# Patient Record
Sex: MALE | Race: WHITE | URBAN - NONMETROPOLITAN AREA
[De-identification: names, ages, dates, MRNs, and addresses within clinical notes are randomized per-mention and may not be internally consistent; named-entity substitution may affect disease eponyms.]

---

## 2017-09-08 ENCOUNTER — HOSPITAL ENCOUNTER (INPATIENT)
Facility: HOSPITAL | Age: 82
LOS: 5 days | Discharge: HOME | DRG: 603 | End: 2017-09-13
Attending: FAMILY MEDICINE | Admitting: FAMILY MEDICINE
Payer: MEDICARE

## 2017-09-08 DIAGNOSIS — L03.119 CELLULITIS AND ABSCESS OF FOOT: Primary | ICD-10-CM

## 2017-09-08 DIAGNOSIS — L02.619 CELLULITIS AND ABSCESS OF FOOT: Primary | ICD-10-CM

## 2017-09-08 LAB
ALBUMIN SERPL-MCNC: 3.4 G/DL (ref 3.4–5)
ALP SERPL-CCNC: 61 U/L (ref 40–150)
ALT SERPL W P-5'-P-CCNC: 21 U/L (ref 0–70)
ANION GAP SERPL CALCULATED.3IONS-SCNC: 9 MMOL/L (ref 3–14)
AST SERPL W P-5'-P-CCNC: 13 U/L (ref 0–45)
BASOPHILS # BLD AUTO: 0 10E9/L (ref 0–0.2)
BASOPHILS NFR BLD AUTO: 0.3 %
BILIRUB SERPL-MCNC: 0.4 MG/DL (ref 0.2–1.3)
BUN SERPL-MCNC: 14 MG/DL (ref 7–30)
CALCIUM SERPL-MCNC: 8.8 MG/DL (ref 8.5–10.1)
CHLORIDE SERPL-SCNC: 105 MMOL/L (ref 94–109)
CO2 SERPL-SCNC: 28 MMOL/L (ref 20–32)
CREAT SERPL-MCNC: 0.88 MG/DL (ref 0.66–1.25)
DIFFERENTIAL METHOD BLD: ABNORMAL
EOSINOPHIL # BLD AUTO: 0.1 10E9/L (ref 0–0.7)
EOSINOPHIL NFR BLD AUTO: 1.3 %
ERYTHROCYTE [DISTWIDTH] IN BLOOD BY AUTOMATED COUNT: 14 % (ref 10–15)
GFR SERPL CREATININE-BSD FRML MDRD: 81 ML/MIN/1.7M2
GLUCOSE SERPL-MCNC: 113 MG/DL (ref 70–99)
HCT VFR BLD AUTO: 34.9 % (ref 40–53)
HGB BLD-MCNC: 11.8 G/DL (ref 13.3–17.7)
IMM GRANULOCYTES # BLD: 0 10E9/L (ref 0–0.4)
IMM GRANULOCYTES NFR BLD: 0.3 %
LYMPHOCYTES # BLD AUTO: 1.7 10E9/L (ref 0.8–5.3)
LYMPHOCYTES NFR BLD AUTO: 18.8 %
MCH RBC QN AUTO: 31.1 PG (ref 26.5–33)
MCHC RBC AUTO-ENTMCNC: 33.8 G/DL (ref 31.5–36.5)
MCV RBC AUTO: 92 FL (ref 78–100)
MONOCYTES # BLD AUTO: 1.1 10E9/L (ref 0–1.3)
MONOCYTES NFR BLD AUTO: 11.9 %
NEUTROPHILS # BLD AUTO: 6 10E9/L (ref 1.6–8.3)
NEUTROPHILS NFR BLD AUTO: 67.4 %
NRBC # BLD AUTO: 0 10*3/UL
NRBC BLD AUTO-RTO: 0 /100
PLATELET # BLD AUTO: 199 10E9/L (ref 150–450)
POTASSIUM SERPL-SCNC: 4.4 MMOL/L (ref 3.4–5.3)
PROT SERPL-MCNC: 7.9 G/DL (ref 6.8–8.8)
RBC # BLD AUTO: 3.8 10E12/L (ref 4.4–5.9)
SODIUM SERPL-SCNC: 142 MMOL/L (ref 133–144)
WBC # BLD AUTO: 8.9 10E9/L (ref 4–11)

## 2017-09-08 PROCEDURE — 25000125 ZZHC RX 250: Performed by: FAMILY MEDICINE

## 2017-09-08 PROCEDURE — 73720 MRI LWR EXTREMITY W/O&W/DYE: CPT | Mod: TC,RT

## 2017-09-08 PROCEDURE — 40000786 ZZHCL STATISTIC ACTIVE MRSA SURVEILLANCE CULTURE: Performed by: FAMILY MEDICINE

## 2017-09-08 PROCEDURE — 25000128 H RX IP 250 OP 636: Performed by: FAMILY MEDICINE

## 2017-09-08 PROCEDURE — A9270 NON-COVERED ITEM OR SERVICE: HCPCS | Mod: GY | Performed by: FAMILY MEDICINE

## 2017-09-08 PROCEDURE — 12000000 ZZH R&B MED SURG/OB

## 2017-09-08 PROCEDURE — S0077 INJECTION, CLINDAMYCIN PHOSP: HCPCS | Performed by: FAMILY MEDICINE

## 2017-09-08 PROCEDURE — A9585 GADOBUTROL INJECTION: HCPCS | Mod: TC

## 2017-09-08 PROCEDURE — 25000132 ZZH RX MED GY IP 250 OP 250 PS 637: Mod: GY | Performed by: FAMILY MEDICINE

## 2017-09-08 PROCEDURE — 36415 COLL VENOUS BLD VENIPUNCTURE: CPT | Performed by: FAMILY MEDICINE

## 2017-09-08 PROCEDURE — 85025 COMPLETE CBC W/AUTO DIFF WBC: CPT | Performed by: FAMILY MEDICINE

## 2017-09-08 PROCEDURE — 80053 COMPREHEN METABOLIC PANEL: CPT | Performed by: FAMILY MEDICINE

## 2017-09-08 PROCEDURE — 87040 BLOOD CULTURE FOR BACTERIA: CPT | Performed by: FAMILY MEDICINE

## 2017-09-08 RX ORDER — GABAPENTIN 300 MG/1
300 CAPSULE ORAL 3 TIMES DAILY
Status: DISCONTINUED | OUTPATIENT
Start: 2017-09-08 | End: 2017-09-13 | Stop reason: HOSPADM

## 2017-09-08 RX ORDER — LIDOCAINE 40 MG/G
CREAM TOPICAL
Status: DISCONTINUED | OUTPATIENT
Start: 2017-09-08 | End: 2017-09-13 | Stop reason: HOSPADM

## 2017-09-08 RX ORDER — CALCIUM CARBONATE 500 MG/1
500 TABLET, CHEWABLE ORAL 3 TIMES DAILY PRN
Status: DISCONTINUED | OUTPATIENT
Start: 2017-09-08 | End: 2017-09-13 | Stop reason: HOSPADM

## 2017-09-08 RX ORDER — CLINDAMYCIN PHOSPHATE 900 MG/50ML
900 INJECTION, SOLUTION INTRAVENOUS EVERY 8 HOURS
Status: DISCONTINUED | OUTPATIENT
Start: 2017-09-08 | End: 2017-09-13 | Stop reason: HOSPADM

## 2017-09-08 RX ORDER — CARVEDILOL 3.12 MG/1
6.25 TABLET ORAL 2 TIMES DAILY WITH MEALS
Status: DISCONTINUED | OUTPATIENT
Start: 2017-09-08 | End: 2017-09-09

## 2017-09-08 RX ORDER — ONDANSETRON 4 MG/1
4 TABLET, ORALLY DISINTEGRATING ORAL EVERY 6 HOURS PRN
Status: DISCONTINUED | OUTPATIENT
Start: 2017-09-08 | End: 2017-09-13 | Stop reason: HOSPADM

## 2017-09-08 RX ORDER — ISOSORBIDE MONONITRATE 30 MG/1
30 TABLET, EXTENDED RELEASE ORAL EVERY EVENING
Status: DISCONTINUED | OUTPATIENT
Start: 2017-09-08 | End: 2017-09-13 | Stop reason: HOSPADM

## 2017-09-08 RX ORDER — HYDROCODONE BITARTRATE AND ACETAMINOPHEN 5; 325 MG/1; MG/1
1-2 TABLET ORAL EVERY 4 HOURS PRN
Status: DISCONTINUED | OUTPATIENT
Start: 2017-09-08 | End: 2017-09-13 | Stop reason: HOSPADM

## 2017-09-08 RX ORDER — NALOXONE HYDROCHLORIDE 0.4 MG/ML
.1-.4 INJECTION, SOLUTION INTRAMUSCULAR; INTRAVENOUS; SUBCUTANEOUS
Status: DISCONTINUED | OUTPATIENT
Start: 2017-09-08 | End: 2017-09-13 | Stop reason: HOSPADM

## 2017-09-08 RX ORDER — ASPIRIN 81 MG/1
81 TABLET, CHEWABLE ORAL DAILY
Status: DISCONTINUED | OUTPATIENT
Start: 2017-09-09 | End: 2017-09-13 | Stop reason: HOSPADM

## 2017-09-08 RX ORDER — GADOBUTROL 604.72 MG/ML
7.5 INJECTION INTRAVENOUS ONCE
Status: COMPLETED | OUTPATIENT
Start: 2017-09-08 | End: 2017-09-08

## 2017-09-08 RX ORDER — ACETAMINOPHEN 325 MG/1
650 TABLET ORAL EVERY 4 HOURS PRN
Status: DISCONTINUED | OUTPATIENT
Start: 2017-09-08 | End: 2017-09-13 | Stop reason: HOSPADM

## 2017-09-08 RX ORDER — ONDANSETRON 2 MG/ML
4 INJECTION INTRAMUSCULAR; INTRAVENOUS EVERY 6 HOURS PRN
Status: DISCONTINUED | OUTPATIENT
Start: 2017-09-08 | End: 2017-09-13 | Stop reason: HOSPADM

## 2017-09-08 RX ADMIN — CARVEDILOL 3.12 MG: 3.12 TABLET, FILM COATED ORAL at 21:38

## 2017-09-08 RX ADMIN — GADOBUTROL 7.5 ML: 604.72 INJECTION INTRAVENOUS at 15:42

## 2017-09-08 RX ADMIN — ISOSORBIDE MONONITRATE 30 MG: 30 TABLET, EXTENDED RELEASE ORAL at 21:40

## 2017-09-08 RX ADMIN — CLINDAMYCIN PHOSPHATE 900 MG: 18 INJECTION, SOLUTION INTRAVENOUS at 14:02

## 2017-09-08 RX ADMIN — VANCOMYCIN HYDROCHLORIDE 1500 MG: 1 INJECTION, POWDER, LYOPHILIZED, FOR SOLUTION INTRAVENOUS at 14:47

## 2017-09-08 RX ADMIN — CLINDAMYCIN PHOSPHATE 900 MG: 18 INJECTION, SOLUTION INTRAVENOUS at 21:32

## 2017-09-08 RX ADMIN — GABAPENTIN 300 MG: 300 CAPSULE ORAL at 16:06

## 2017-09-08 RX ADMIN — GABAPENTIN 300 MG: 300 CAPSULE ORAL at 21:39

## 2017-09-08 NOTE — PLAN OF CARE
"[facility] Observation Note:  Patient is registered to observation and is in 3218/3218-1 at approximately 1155 via wheel chair accompanied by staff  from clinic .  Patient ambulated to bed via self.. Patient is alert and oriented X 3, reports pain; rates at 3 on 0-10 scale.  Patient oriented to room, unit, hourly rounding, and plan of care. Explained the admission packet including \"What is Observation Status\" and patient handbook with patient bill of rights brochure. Will continue to monitor and document as needed.  Nursing Observation criteria listed below was met:    Health care directives status obtained and documented: Yes    Care Everywhere authorization completed No      MRSA swab completed for patient 65 years and older: Yes      If initial lactic acid >2.0, repeat lactic acid drawn within one hour of arrival to unit: NA. If no, state reason: NA      Patient identifies a surrogate decision maker: Yes If yes, who:Wife  Contact Information:see facesheet     Vaccination assessment and education completed: Yes   Vaccinations received prior to hospitalization: Pneumovax yes  Influenza(seasonal)  YES   Vaccination(s) ordered: patient does not meet criteria      Skin issues/needs documented:Yes    Isolation Patient: no Education given and documented, correct sign in place and documentation row added to PCS:  No      Fall Prevention: Observation fall risk completed:  Yes Education given and documented, sticker and magnet in place: Yes      General Care Plan initiated with observation goal(s): Yes    Education (including assessment) Documented: Yes    New medication information given to patient and documented: NA     Patient elects to use own medications from home during hospitalization:  No If yes, a MD order was obtained to use Medications from Home:  No and home medications were sent to Pharmacy for verification for use during hospitalization: NA     Patient has discharge needs (If yes, please explain): " No

## 2017-09-08 NOTE — PHARMACY-VANCOMYCIN DOSING SERVICE
Pharmacy Vancomycin Initial Note  Date of Service 2017  Patient's  [id number]  88 year old, male    Indication: Skin and Soft Tissue Infection    Current estimated CrCl = Estimated Creatinine Clearance: 63.2 mL/min (based on Cr of 0.88).    Creatinine for last 3 days  2017:  1:29 PM Creatinine 0.88 mg/dL    Recent Vancomycin Level(s) for last 3 days  No results found for requested labs within last 72 hours.      Vancomycin IV Administrations (past 72 hours)      No vancomycin orders with administrations in past 72 hours.                Nephrotoxins and other renal medications (Future)    Start     Dose/Rate Route Frequency Ordered Stop    17 1530  vancomycin (VANCOCIN) 1,500 mg in NaCl 0.9 % 500 mL intermittent infusion      1,500 mg  376.7 mL/hr over 1.5 Hours Intravenous EVERY 18 HOURS 17 1421            Contrast Orders - past 72 hours     None                Plan:  1.  Start vancomycin  1500 mg IV q18h.   2.  Goal Trough Level: 15-20 mg/L assuming MRSA is suspected.  3.  Pharmacy will check trough levels as appropriate in 1-3 Days.    4. Serum creatinine levels will be ordered daily for the first week of therapy and at least twice weekly for subsequent weeks.    5. [place] method utilized to dose vancomycin therapy: [facility]    Dona Garcia

## 2017-09-08 NOTE — IP AVS SNAPSHOT
MRN:[id number]                      After Visit Summary   9/8/2017    Chino Harper    MRN: [id number]           Thank you!     Thank you for choosing [facility] for your care. Our goal is always to provide you with excellent care. Hearing back from our patients is one way we can continue to improve our services. Please take a few minutes to complete the written survey that you may receive in the mail after you visit with us. Thank you!        Patient Information     Date Of Birth          12/9/1928        Designated Caregiver       Most Recent Value    Caregiver    Will someone help with your care after discharge? yes    Name of designated caregiver Sandrita     Phone number of caregiver [phone]    Caregiver address [place]       About your hospital stay     You were admitted on:  September 8, 2017 You last received care in the:  [facility]    You were discharged on:  September 13, 2017       Who to Call     For medical emergencies, please call 911.  For non-urgent questions about your medical care, please call your primary care provider or clinic, [phone]          Attending Provider     Provider Specialty    Ban Ivy MD [facility]       Primary Care Provider Office Phone # Fax #    Ban Ivy [phone]      Further instructions from your care team       MRI Contrast Discharge Instructions    The IV contrast you received today will pass out of your body in your  urine. This will happen in the next 24 hours. You will not feel this process.  Your urine will not change color.    Drink at least 4 extra glasses of water or juice today (unless your doctor  has restricted your fluids). This reduces the stress on your kidneys.  You may take your regular medicines.    If you are on dialysis: It is best to have dialysis today.    If you have a reaction: Most reactions happen right away. If you have  any new symptoms after leaving the  "hospital (such as hives or swelling),  call your hospital at the correct number below. Or call your family doctor.  If you have breathing distress or wheezing, call 911.    Special instructions: ***    I have read and understand the above information.    Signature:______________________________________ Date:______[birth year]_____    Staff:__________________________________________ Date:___________     Time:__________    [facility] Departments:    ___[facility]: [phone]  ___[place]: [phone]  ___[place]: [phone] ___[facility]: [phone]  ___[facility]: [phone]  ___[facility]: [phone]  ___[place]: [phone]  ___[place]: [phone]  ___[phone]      YOU HAVE AN APPOINTMENT SET UP WITH DR BREWSTER AT THE [facility] [place] , Wednesday AT 10:30.    Pending Results     Date and Time Order Name Status Description    2017 1147 Blood culture Preliminary     2017 1147 Blood culture Preliminary             Statement of Approval     Ordered          17 0734  I have reviewed and agree with all the recommendations and orders detailed in this document.  EFFECTIVE NOW     Comments:  Discharge to home.  Has appt dr brewster/surgeon at 1030 at [facility]. Needs to be there at 1015   Approved and electronically signed by:  Ban Ivy MD             Admission Information     Date & Time Provider Department Dept. Phone    2017 Ban Ivy MD [facility] [phone]      Your Vitals Were     Blood Pressure Pulse Temperature Respirations Height Weight    146/79 (BP Location: Right arm) 74 98.7  F (37.1  C) (Tympanic) 20 1.753 m (5' 9\") 77 kg (169 lb 12.1 oz)    Pulse Oximetry BMI (Body Mass Index)                95% 25.07 kg/m2          [email or web address] Information     [email or web address] lets you send messages to your doctor, view your test results, renew your prescriptions, schedule appointments and more. To sign up, go to [email or web address] . Click " "on \"Log in\" on the left side of the screen, which will take you to the Welcome page. Then click on \"Sign up Now\" on the right side of the page.     You will be asked to enter the access code listed below, as well as some personal information. Please follow the directions to create your username and password.     Your access code is: [email or web address]  Expires: 2017  7:39 AM     Your access code will  in 90 days. If you need help or a new code, please call your [facility] clinic or [phone].        Care EveryWhere ID     This is your Care EveryWhere ID. This could be used by other organizations to access your [facility] medical records  [id number]        Equal Access to Services     SHAZIA SKELTON : Rebecca Zambrano, danii jin, britton mendez, jyotsna cerda . So [facility] [phone].    ATENCIÓN: Si habla español, tiene a dickson disposición servicios gratuitos de asistencia lingüística. Llame al [phone].    We comply with applicable federal civil rights laws and [place] laws. We do not discriminate on the basis of race, color, national origin, age, disability sex, sexual orientation or gender identity.               Review of your medicines      START taking        Dose / Directions    clindamycin 300 MG capsule   Commonly known as:  CLEOCIN        Dose:  300 mg   Take 1 capsule (300 mg) by mouth 3 times daily for 7 days   Quantity:  21 capsule   Refills:  0         CONTINUE these medicines which have NOT CHANGED        Dose / Directions    ASPIRIN PO        Dose:  81 mg   Take 81 mg by mouth daily   Refills:  0       calcium carbonate 500 MG chewable tablet   Commonly known as:  TUMS        Dose:  1 chew tab   Take 1 chew tab by mouth as needed for heartburn   Refills:  0       carvedilol 6.25 MG tablet   Commonly known as:  COREG   Used for:  ACS (acute coronary syndrome) (H)        Dose:  6.25 mg   Take 1 tablet (6.25 mg) by mouth 2 times daily (with " meals)   Quantity:  60 tablet   Refills:  0       GABAPENTIN PO        Dose:  300 mg   Take 300 mg by mouth 3 times daily   Refills:  0       GuaiFENesin 200 MG/5ML Liqd        Dose:  10 mL   Take 10 mLs by mouth 3 times daily as needed   Quantity:  118 mL   Refills:  0       isosorbide mononitrate 30 MG 24 hr tablet   Commonly known as:  IMDUR   Used for:  ACS (acute coronary syndrome) (H)        Dose:  30 mg   Take 1 tablet (30 mg) by mouth daily   Quantity:  30 tablet   Refills:  0       OMEGA-3 FISH OIL PO        Dose:  2 g   Take 2 g by mouth daily   Refills:  0            Where to get your medicines      Some of these will need a paper prescription and others can be bought over the counter. Ask your nurse if you have questions.     You don't need a prescription for these medications     clindamycin 300 MG capsule                Protect others around you: Learn how to safely use, store and throw away your medicines at www.disposemymeds.org.             Medication List: This is a list of all your medications and when to take them. Check marks below indicate your daily home schedule. Keep this list as a reference.      Medications           Morning Afternoon Evening Bedtime As Needed    ASPIRIN PO   Take 81 mg by mouth daily   Last time this was given:  81 mg on 9/13/2017  6:40 AM                                calcium carbonate 500 MG chewable tablet   Commonly known as:  TUMS   Take 1 chew tab by mouth as needed for heartburn                                carvedilol 6.25 MG tablet   Commonly known as:  COREG   Take 1 tablet (6.25 mg) by mouth 2 times daily (with meals)   Last time this was given:  3.125 mg on 9/13/2017  6:41 AM                                clindamycin 300 MG capsule   Commonly known as:  CLEOCIN   Take 1 capsule (300 mg) by mouth 3 times daily for 7 days                                GABAPENTIN PO   Take 300 mg by mouth 3 times daily   Last time this was given:  300 mg on 9/13/2017  6:40 AM                                 GuaiFENesin 200 MG/5ML Liqd   Take 10 mLs by mouth 3 times daily as needed                                isosorbide mononitrate 30 MG 24 hr tablet   Commonly known as:  IMDUR   Take 1 tablet (30 mg) by mouth daily   Last time this was given:  30 mg on 9/12/2017  8:09 PM                                OMEGA-3 FISH OIL PO   Take 2 g by mouth daily                                          More Information        Enoxaparin Sodium Solution for injection  What is this medicine?  ENOXAPARIN (ee nox a PA rin) is used after knee, hip, or abdominal surgeries to prevent blood clotting. It is also used to treat existing blood clots in the lungs or in the veins.  This medicine may be used for other purposes; ask your health care provider or pharmacist if you have questions.  What should I tell my health care provider before I take this medicine?  They need to know if you have any of these conditions:    bleeding disorders, hemorrhage, or hemophilia    infection of the heart or heart valves    kidney or liver disease    previous stroke    prosthetic heart valve    recent surgery or delivery of a baby    ulcer in the stomach or intestine, diverticulitis, or other bowel disease    an unusual or allergic reaction to enoxaparin, heparin, pork or pork products, other medicines, foods, dyes, or preservatives    pregnant or trying to get pregnant    breast-feeding  How should I use this medicine?  This medicine is for injection under the skin. It is usually given by a health-care professional. You or a family member may be trained on how to give the injections. If you are to give yourself injections, make sure you understand how to use the syringe, measure the dose if necessary, and give the injection. To avoid bruising, do not rub the site where this medicine has been injected. Do not take your medicine more often than directed. Do not stop taking except on the advice of your doctor or health care  professional.  Make sure you receive a puncture-resistant container to dispose of the needles and syringes once you have finished with them. Do not reuse these items. Return the container to your doctor or health care professional for proper disposal.  Talk to your pediatrician regarding the use of this medicine in children. Special care may be needed.  Overdosage: If you think you have taken too much of this medicine contact a poison control center or emergency room at once.  NOTE: This medicine is only for you. Do not share this medicine with others.  What if I miss a dose?  If you miss a dose, take it as soon as you can. If it is almost time for your next dose, take only that dose. Do not take double or extra doses.  What may interact with this medicine?    aspirin and aspirin-like medicines    certain medicines that treat or prevent blood clots    dipyridamole    NSAIDs, medicines for pain and inflammation, like ibuprofen or naproxen  This list may not describe all possible interactions. Give your health care provider a list of all the medicines, herbs, non-prescription drugs, or dietary supplements you use. Also tell them if you smoke, drink alcohol, or use illegal drugs. Some items may interact with your medicine.  What should I watch for while using this medicine?  Visit your doctor or health care professional for regular checks on your progress. Your condition will be monitored carefully while you are receiving this medicine.  Notify your doctor or health care professional and seek emergency treatment if you develop breathing problems; changes in vision; chest pain; severe, sudden headache; pain, swelling, warmth in the leg; trouble speaking; sudden numbness or weakness of the face, arm, or leg. These can be signs that your condition has gotten worse.  If you are going to have surgery, tell your doctor or health care professional that you are taking this medicine.  Do not stop taking this medicine without  first talking to your doctor. Be sure to refill your prescription before you run out of medicine.  Avoid sports and activities that might cause injury while you are using this medicine. Severe falls or injuries can cause unseen bleeding. Be careful when using sharp tools or knives. Consider using an electric razor. Take special care brushing or flossing your teeth. Report any injuries, bruising, or red spots on the skin to your doctor or health care professional.  What side effects may I notice from receiving this medicine?  Side effects that you should report to your doctor or health care professional as soon as possible:    allergic reactions like skin rash, itching or hives, swelling of the face, lips, or tongue    feeling faint or lightheaded, falls    signs and symptoms of bleeding such as bloody or black, tarry stools; red or dark-brown urine; spitting up blood or brown material that looks like coffee grounds; red spots on the skin; unusual bruising or bleeding from the eye, gums, or nose  Side effects that usually do not require medical attention (report to your doctor or health care professional if they continue or are bothersome):    pain, redness, or irritation at site where injected  This list may not describe all possible side effects. Call your doctor for medical advice about side effects. You may report side effects to FDA at 2-306-FDA-1132.  Where should I keep my medicine?  Keep out of the reach of children.  Store at room temperature between 15 and 30 degrees C (59 and 86 degrees F). Do not freeze. If your injections have been specially prepared, you may need to store them in the refrigerator. Ask your pharmacist. Throw away any unused medicine after the expiration date.  NOTE:This sheet is a summary. It may not cover all possible information. If you have questions about this medicine, talk to your doctor, pharmacist, or health care provider. Copyright  2016 [employer] Standard                Enoxaparin  injection  What is this medicine?  ENOXAPARIN (ee nox a PA rin) is used after knee, hip, or abdominal surgeries to prevent blood clotting. It is also used to treat existing blood clots in the lungs or in the veins.  How should I use this medicine?  This medicine is for injection under the skin. It is usually given by a health-care professional. You or a family member may be trained on how to give the injections. If you are to give yourself injections, make sure you understand how to use the syringe, measure the dose if necessary, and give the injection. To avoid bruising, do not rub the site where this medicine has been injected. Do not take your medicine more often than directed. Do not stop taking except on the advice of your doctor or health care professional.  Make sure you receive a puncture-resistant container to dispose of the needles and syringes once you have finished with them. Do not reuse these items. Return the container to your doctor or health care professional for proper disposal.  Talk to your pediatrician regarding the use of this medicine in children. Special care may be needed.  What side effects may I notice from receiving this medicine?  Side effects that you should report to your doctor or health care professional as soon as possible:    allergic reactions like skin rash, itching or hives, swelling of the face, lips, or tongue    feeling faint or lightheaded, falls    signs and symptoms of bleeding such as bloody or black, tarry stools; red or dark-brown urine; spitting up blood or brown material that looks like coffee grounds; red spots on the skin; unusual bruising or bleeding from the eye, gums, or nose  Side effects that usually do not require medical attention (report to your doctor or health care professional if they continue or are bothersome):    pain, redness, or irritation at site where injected  What may interact with this medicine?    aspirin and aspirin-like medicines    certain  medicines that treat or prevent blood clots    dipyridamole    NSAIDs, medicines for pain and inflammation, like ibuprofen or naproxen  What if I miss a dose?  If you miss a dose, take it as soon as you can. If it is almost time for your next dose, take only that dose. Do not take double or extra doses.  Where should I keep my medicine?  Keep out of the reach of children.  Store at room temperature between 15 and 30 degrees C (59 and 86 degrees F). Do not freeze. If your injections have been specially prepared, you may need to store them in the refrigerator. Ask your pharmacist. Throw away any unused medicine after the expiration date.  What should I tell my health care provider before I take this medicine?  They need to know if you have any of these conditions:    bleeding disorders, hemorrhage, or hemophilia    infection of the heart or heart valves    kidney or liver disease    previous stroke    prosthetic heart valve    recent surgery or delivery of a baby    ulcer in the stomach or intestine, diverticulitis, or other bowel disease    an unusual or allergic reaction to enoxaparin, heparin, pork or pork products, other medicines, foods, dyes, or preservatives    pregnant or trying to get pregnant    breast-feeding  What should I watch for while using this medicine?  Visit your doctor or health care professional for regular checks on your progress. Your condition will be monitored carefully while you are receiving this medicine.  Notify your doctor or health care professional and seek emergency treatment if you develop breathing problems; changes in vision; chest pain; severe, sudden headache; pain, swelling, warmth in the leg; trouble speaking; sudden numbness or weakness of the face, arm, or leg. These can be signs that your condition has gotten worse.  If you are going to have surgery, tell your doctor or health care professional that you are taking this medicine.  Do not stop taking this medicine without  first talking to your doctor. Be sure to refill your prescription before you run out of medicine.  Avoid sports and activities that might cause injury while you are using this medicine. Severe falls or injuries can cause unseen bleeding. Be careful when using sharp tools or knives. Consider using an electric razor. Take special care brushing or flossing your teeth. Report any injuries, bruising, or red spots on the skin to your doctor or health care professional.  NOTE:This sheet is a summary. It may not cover all possible information. If you have questions about this medicine, talk to your doctor, pharmacist, or health care provider. Copyright  2017 Gold Standard                Discharge Instructions for Cellulitis  You have been diagnosed with cellulitis. This is an infection in the deepest layer of the skin. In some cases, the infection also affects the muscle. Cellulitis is caused by bacteria. The bacteria can enter the body through broken skin. This can happen with a cut, scratch, animal bite, or an insect bite that has been scratched. You may have been treated in the hospital with antibiotics and fluids. You will likely be given a prescription for antibiotics to take at home. This sheet will help you take care of yourself at home.  Home care  When you are home:    Take the prescribed antibiotic medicine you are given as directed until it is gone. Take it even if you feel better. It treats the infection and stops it from returning. Not taking all the medicine can make future infections hard to treat.    Keep the infected area clean.    When possible, raise the infected area above the level of your heart. This helps keep swelling down.    Talk with your healthcare provider if you are in pain. Ask what kind of over-the-counter medicine you can take for pain.    Apply clean bandages as advised.    Take your temperature once a day for a week.    Wash your hands often to prevent spreading the infection.  In the  future, wash your hands before and after you touch cuts, scratches, or bandages. This will help prevent infection.   When to call your healthcare provider  Call your healthcare provider immediately if you have any of the following:    Difficulty or pain when moving the joints above or below the infected area    Discharge or pus draining from the area    Fever of 100.4 F (38 C) or higher, or as directed by your healthcare provider    Pain that gets worse in or around the infected     Redness that gets worse in or around the infected area, particularly if the area of redness expands to a wider area    Shaking chills    Swelling of the infected area    Vomiting   Date Last Reviewed: 8/1/2016 2000-2017 [employer]. [address] [place] [address], [place] [address]. All rights reserved. This information is not intended as a substitute for professional medical care. Always follow your healthcare professional's instructions.                Cellulitis  Cellulitis is an infection of the deep layers of skin. A break in the skin, such as a cut or scratch, can let bacteria under the skin. If the bacteria get to deep layers of the skin, it can be serious. If not treated, cellulitis can get into the bloodstream and lymph nodes. The infection can then spread throughout the body. This causes serious illness.  Cellulitis causes the affected skin to become red, swollen, warm, and sore. The reddened areas have a visible border. An open sore may leak fluid (pus). You may have a fever, chills, and pain.  Cellulitis is treated with antibiotics taken for 7 to 10 days. An open sore may be cleaned and covered with cool wet gauze. Symptoms should get better 1 to 2 days after treatment is started. Make sure to take all the antibiotics for the full number of days until they are gone. Keep taking the medicine even if your symptoms go away.  Home care  Follow these tips:    Limit the use of the part of your body with cellulitis.     If  the infection is on your leg, keep your leg raised while sitting. This will help to reduce swelling.    Take all of the antibiotic medicine exactly as directed until it is gone. Do not miss any doses, especially during the first 7 days. Don t stop taking the medicine when your symptoms get better.    Keep the affected area clean and dry.    Wash your hands with soap and warm water before and after touching your skin. Anyone else who touches your skin should also wash his or her hands. Don't share towels.  Follow-up care  Follow up with your healthcare provider, or as advised. If your infection does not go away on the first antibiotic, your healthcare provider will prescribe a different one.  When to seek medical advice  Call your healthcare provider right away if any of these occur:    Red areas that spread    Swelling or pain that gets worse    Fluid leaking from the skin (pus)    Fever higher of 100.4  F (38.0  C) or higher after 2 days on antibiotics  Date Last Reviewed: 9/1/2016 2000-2017 [employer]. [address] [place] [address]. All rights reserved. This information is not intended as a substitute for professional medical care. Always follow your healthcare professional's instructions.                Recognizing and Treating Wound Infection  Wounds can become infected with harmful germs (bacteria). This prevents healing. It also increases your risk of scars. In some cases, the infection may spread to other parts of your body. And infection with the bacteria that cause tetanus can be fatal. Know what to look for and get prompt treatment for infection.    Risk factors  A wound is more likely to become infected if it:    Results from a hole (puncture), such as from a nail or piece of glass    Results from a human or animal bite    Isn't cleaned or treated within 8 hours    Occurs in your hand, foot, leg, armpit, or groin (the area where your belly meets your thighs)    Contains dirt  or saliva    Heals very slowly    Occurs in a person with diabetes, alcoholism, or a compromised immune system    Symptoms of infection  Call your healthcare provider at the first sign of infection, such as:    Yellow, yellow-green, or foul-smelling drainage from a wound    More pain, swelling, or redness in or near a wound    A change in the color or size of a wound    Red streaks in the skin around the wound    Fever  Treatment  Treatment is likely to depend on the type of infection you have, and how serious it is. Your healthcare provider may prescribe oral antibiotics to help fight bacteria. Your provider may also flush the wound with an antibiotic solution or apply an antibiotic ointment. Sometimes a pocket of pus (abscess) may form. In that case, the abscess will be opened and the fluid drained. You may need hospital care if the infection is very severe.  Preventing wound infection  Follow these steps to help keep wounds from getting infected:    Wash the wound right away with soap and water.    Apply a small amount of antibiotic ointment. You can buy this without a prescription.    Cover wounds with a bandage or gauze dressing. Change daily.    Keep the wound clean and dry for the first 24 hours.    Change the dressing daily using sterile gloves.   Date Last Reviewed: 8/13/2015 2000-2017 [employer]. [address] [place] [address], [place] [address]. All rights reserved. This information is not intended as a substitute for professional medical care. Always follow your healthcare professional's instructions.

## 2017-09-08 NOTE — PLAN OF CARE
Problem: Goal Outcome Summary  Goal: Goal Outcome Summary  Outcome: No Change  Patient admitted today around noon with cellulitis to right foot, right small toe. Toe very reddened and swollen with open areas noted draining real thick white paste/ointment like drainage. Denies pain except for neuropathy pain in feet at times. Afebrile. Down for MRI today . Knows that he needs to drink at least 4 glasses of water this evening. Voiding without difficulty. BBS clear and equal. VSS. Wishes to be DNR. Chart tagged for MD.

## 2017-09-08 NOTE — H&P
CHIEF COMPLAINT:  Foot infection.      SUBJECTIVE:  Chino Clifton is an 88-year-old male that I had seen less than 36 hours ago who presents because of followup of a foot infection.  When he had seen me on Wednesday he had had a history of approximately 3-4 weeks of increasing toe swelling, redness, and it started draining so he presented to the clinic.  The fifth toe was significantly edematous, erythematous, and fluctuant.  I was able to express a fair amount of discharge which was thick and pink in color.  Culture was obtained.  Preliminary culture today shows staphylococcus species with the final identification pending.  The patient was started on oral clindamycin, but in spite of  that the redness and the swelling have not subsided.  In fact, the redness is now progressing up the right anterior foot.  The patient does have known neuropathy for which he is on gabapentin, so this foot is not particularly painful for him.  He denied fevers and chills.  He has had no nausea.      PAST MEDICAL HISTORY:   1.  Coronary artery disease.  His last catheterization in 2015 showed 3-vessel chronic total occlusion with distal high-grade left main stenosis.  He had an intact LIMA to the LAD, intact saphenous graft to the marginal, and intact saphenous graft to the right coronary.  At that point, given his age, it was felt best for medication management including aspirin, statin, and beta blocker.  Patient does not tolerate statins unfortunately.  Echocardiogram at that time showed left ventricle global systolic function normal with an ejection fraction of 55% with no wall motion abnormalities.  He had mild mitral regurgitation but no other valvular abnormalities.   2.  Hypertension.   3.  Hyperlipidemia   4.  Idiopathic peripheral neuropathy.     5.  History of chronic dry eyes.   6.  History of colon cancer.   7.  DM2, diet-controlled.      ALLERGIES:  Grass, smoke, sulfa, statins, and Lyrica.      PAST SURGICAL HISTORY:    1.  CABG x3 in 2000.   2.  Cholecystectomy.   3.  Right knee arthroscopic procedure.   4.  Colon cancer in 1988.   5.  Bladder cancer in 1995.   6.  Colonoscopy 2013 normal with no further recommendation.      MEDICATIONS:   1.  Fish oil/omega 4000 mg a day.   2.  Gabapentin 400 t.i.d.   3.  Isosorbide mononitrate ER 30 mg daily.   4.  Carvedilol 3.125 mg 1 tablet b.i.d.   5.  Clindamycin 3 times daily.      FAMILY HISTORY:  Both parents had coronary artery disease.      SOCIAL HISTORY:  He is .  His wife accompanies him today.  He quit tobacco in 1956.  He is a former .      REVIEW OF SYSTEMS:  Does wear glasses.  He has had no headache or dizziness.  No coughing or chest pain or pressure. No urinary complaints. No diarrhea.otherwise as above.      PHYSICAL EXAMINATION:   VITAL SIGNS:  Weight 175 pounds, blood pressure 112/70, heart rate is 56, temperature is 97.1.   GENERAL:  Elderly male who appears younger than his stated age but in no obvious distress.   HEENT:  Eyes without injection.  TMs are clear.  Oral mucosa normal without oral lesions.   NECK:  Without adenopathy.   CARDIOVASCULAR:  Regular rate and rhythm.  No murmur, click or rub.   LUNGS:  Clear in the anterior and posterior chest.   ABDOMEN:  Soft, nontender, nondistended.   EXTREMITIES:  Palpable pedal pulses.  His right lower extremity, the lateral half of the foot is very erythematous on the anterior surface.  His fifth toe is very edematous, erythematous, fluctuant, with an open area draining on the lateral aspect of the toe.   MENTAL STATUS:  Reveals a euthymic mood, thought content and processes appropriate.   NEUROLOGIC:  Alert and oriented x3.      LABORATORY DATA:  Pending at the time of dictation.      ASSESSMENT AND PLAN:   1.  This is an 88-year-old male with a history of peripheral neuropathy with persistent foot infection or persistent and worsening cellulitis in spite of outpatient treatment.  He will be  admitted for IV antibiotics.  Again, preliminary culture shows staphylococcus species.  The case was discussed with Dr. Aguirre who will be on-call for our group over the weekend.  He will follow up on the culture results, but in the meantime we will start him on IV clindamycin and vancomycin.  We will also given the length of duration of symptoms, we will obtain MRI of the foot to rule out osteomyelitis of that digit.   2.  Coronary artery disease, currently asymptomatic.   3.  Hypertension.  Continue carvedilol.   4.  Diet-controlled diabetes.  His last A1c in August was 6.4.   5.  Hyperlipidemia with intolerance to statins.   6.  Peripheral neuropathy.  Continue gabapentin.   7.  CODE STATUS:  Full code.   8.  Deep venous thrombosis prophylaxis:  Lovenox has been ordered.   9.  Expected length of hospitalization greater than or equal to 2 days.         RAMONITA DICKERSON MD             D: 2017 13:51   T: 2017 14:48   MT: GATO      Name:     DARON KAHN   MRN:      [id number]        Account:      [id number]   :      1928           Admitted:     [id number]      Document: [id number]

## 2017-09-08 NOTE — PLAN OF CARE
Back from MRI now via wheelchair. Patient instructed on drinking at least 4 glasses of water today.

## 2017-09-08 NOTE — PROGRESS NOTES
Dr. Noble Soni called with orders for pt to be entered in the computer.New orders received via TORB. See new orders.   Serena Busch  11:58 AM  September 8, 2017

## 2017-09-08 NOTE — PLAN OF CARE
Face to face report given with opportunity to observe patient.    Report given to Missy Dunlap   9/8/2017  6:58 PM

## 2017-09-08 NOTE — IP AVS SNAPSHOT
[facility]    [address] [place] [address]    [place] [address]    Phone:  [phone]    Fax:  [phone]                                       After Visit Summary   9/8/2017    Chino Harper    MRN: [id number]           After Visit Summary Signature Page     I have received my discharge instructions, and my questions have been answered. I have discussed any challenges I see with this plan with the nurse or doctor.    ..........................................................................................................................................  Patient/Patient Representative Signature      ..........................................................................................................................................  Patient Representative Print Name and Relationship to Patient    ..................................................               ................................................  Date                                            Time    ..........................................................................................................................................  Reviewed by Signature/Title    ...................................................              ..............................................  Date                                                            Time

## 2017-09-08 NOTE — PROGRESS NOTES
Chino Harper 88 year old    Returned from MRI  Exam performed: MRI RIGHT FOOT W/WO  Tolerated Procedure: well  May resume previous diet: Yes  Pushed to radiologist reading service? Not applicable  Time returned to unit: 16:00  Handoff Method: Report given to RN   Was patient held? NO  If yes, by whom? NA  9/8/2017 4:15 PM  Wilfred Greene

## 2017-09-09 LAB
BACTERIA SPEC CULT: NORMAL
SPECIMEN SOURCE: NORMAL

## 2017-09-09 PROCEDURE — 25000132 ZZH RX MED GY IP 250 OP 250 PS 637: Mod: GY | Performed by: FAMILY MEDICINE

## 2017-09-09 PROCEDURE — A9270 NON-COVERED ITEM OR SERVICE: HCPCS | Mod: GY | Performed by: FAMILY MEDICINE

## 2017-09-09 PROCEDURE — 12000000 ZZH R&B MED SURG/OB

## 2017-09-09 PROCEDURE — 25000128 H RX IP 250 OP 636: Performed by: FAMILY MEDICINE

## 2017-09-09 PROCEDURE — S0077 INJECTION, CLINDAMYCIN PHOSP: HCPCS | Performed by: FAMILY MEDICINE

## 2017-09-09 PROCEDURE — 25000125 ZZHC RX 250: Performed by: FAMILY MEDICINE

## 2017-09-09 RX ORDER — CARVEDILOL 3.12 MG/1
3.12 TABLET ORAL 2 TIMES DAILY WITH MEALS
Status: DISCONTINUED | OUTPATIENT
Start: 2017-09-09 | End: 2017-09-09

## 2017-09-09 RX ORDER — CARVEDILOL 3.12 MG/1
3.12 TABLET ORAL 2 TIMES DAILY WITH MEALS
Status: DISCONTINUED | OUTPATIENT
Start: 2017-09-09 | End: 2017-09-11

## 2017-09-09 RX ADMIN — CLINDAMYCIN PHOSPHATE 900 MG: 18 INJECTION, SOLUTION INTRAVENOUS at 06:08

## 2017-09-09 RX ADMIN — ENOXAPARIN SODIUM 40 MG: 40 INJECTION SUBCUTANEOUS at 14:06

## 2017-09-09 RX ADMIN — GABAPENTIN 300 MG: 300 CAPSULE ORAL at 08:45

## 2017-09-09 RX ADMIN — ISOSORBIDE MONONITRATE 30 MG: 30 TABLET, EXTENDED RELEASE ORAL at 20:26

## 2017-09-09 RX ADMIN — GABAPENTIN 300 MG: 300 CAPSULE ORAL at 20:26

## 2017-09-09 RX ADMIN — CARVEDILOL 3.12 MG: 3.12 TABLET, FILM COATED ORAL at 20:26

## 2017-09-09 RX ADMIN — CARVEDILOL 3.12 MG: 3.12 TABLET, FILM COATED ORAL at 09:17

## 2017-09-09 RX ADMIN — ASPIRIN 81 MG 81 MG: 81 TABLET ORAL at 08:44

## 2017-09-09 RX ADMIN — GABAPENTIN 300 MG: 300 CAPSULE ORAL at 14:05

## 2017-09-09 RX ADMIN — CLINDAMYCIN PHOSPHATE 900 MG: 18 INJECTION, SOLUTION INTRAVENOUS at 14:06

## 2017-09-09 RX ADMIN — CLINDAMYCIN PHOSPHATE 900 MG: 18 INJECTION, SOLUTION INTRAVENOUS at 22:14

## 2017-09-09 RX ADMIN — VANCOMYCIN HYDROCHLORIDE 1500 MG: 1 INJECTION, POWDER, LYOPHILIZED, FOR SOLUTION INTRAVENOUS at 09:17

## 2017-09-09 NOTE — PROGRESS NOTES
[facility] Practice Progress Note    Date of Service (when I saw the patient): 09/09/2017     Assessment & Plan   Chino Harper is a 88 year old male who was admitted on 9/8/2017.     Active Problems:    Cellulitis and abscess of foot    Assessment: about the same overnight    Plan: continue current medications      DVT Prophylaxis: Enoxaparin (Lovenox) SQ  Code Status: DNR / DNI    Shorty Aguirre    Interval History   Stable. Doing well. Improving slowly. Pain is reasonably controlled. No fevers. Ambulating some. Erythema not worsened overnight.      Review Of Systems  CONSTITUTIONAL:  positive for  fevers  HEENT:  negative  RESPIRATORY:  negative for cough or shortness of breath  CARDIOVASCULAR:  negative for  chest pain, palpitations  GASTROINTESTINAL:  negative for nausea, vomiting, diarrhea, constipation and abdominal pain  GENITOURINARY:  negative for frequency and dysuria  INTEGUMENT/BREAST:  positive for erythema of right foot and fifth toe  MUSCULOSKELETAL:  positive for swelling of right foot and fifth toe  BEHAVIOR/PSYCH:  negative for poor concentration and depressed mood    Physical Exam   Temp: 98.9  F (37.2  C) Temp src: Tympanic BP: 157/87   Heart Rate: 70 Resp: 20 SpO2: 98 % O2 Device: None (Room air)    Vitals:    09/08/17 1201   Weight: 77 kg (169 lb 12.1 oz)     Vital Signs with Ranges  Temp:  [96.5  F (35.8  C)-100.3  F (37.9  C)] 98.9  F (37.2  C)  Heart Rate:  [70-72] 70  Resp:  [20] 20  BP: (157-166)/(73-87) 157/87  SpO2:  [96 %-99 %] 98 %  I/O last 3 completed shifts:  In: 1236 [P.O.:480; I.V.:756]  Out: 1745 [Urine:1745]    Peripheral IV 09/08/17 Left Upper forearm (Active)   Site Assessment WDL 9/8/2017 10:00 PM   Line Status Infusing 9/8/2017 10:00 PM   Phlebitis Scale 0-->no symptoms 9/8/2017 10:00 PM   Infiltration Scale 0 9/8/2017 10:00 PM   Number of days:1       Wound 09/08/17 Lateral Foot Other (comment) lateral right foot reddened and dressing CDI to  right small toe  (Active)   Site Assessment UTV 9/8/2017 10:45 PM   Laura-wound Assessment Blisters;Denuded;Edema;Erythema 9/8/2017  4:17 PM   Drainage Amount Scant 9/8/2017  4:17 PM   Drainage Color/Charcteristics Creamy 9/8/2017  4:17 PM   Dressing Dry gauze 9/8/2017 10:45 PM   Dressing Status Clean, dry, intact 9/8/2017 10:45 PM   Wound/Skin Edges (WOC) Attached edges 9/8/2017  4:17 PM   Number of days:1     Line/device assessment(s) completed for medical necessity    Constitutional: awake, alert, cooperative, no apparent distress, and appears stated age  ENT: normocepalic, without obvious abnormality  Respiratory: no increased work of breathing, good air exchange and clear to auscultation  Cardiovascular: regular rate and rhythm and no murmur noted  GI: normal bowel sounds, soft and non-tender  Skin: erythema remains within the outline  Musculoskeletal: swelling of right foot  Neuropsychiatric: General: normal, calm and normal eye contact  Level of consciousness: alert / normal  Affect: normal  Orientation: oriented to self, place, time and situation  Memory and insight: normal, memory for past and recent events intact and thought process normal    Medications        carvedilol  3.125 mg Oral BID w/meals     clindamycin  900 mg Intravenous Q8H     aspirin chewable tablet 81 mg  81 mg Oral Daily     gabapentin (NEURONTIN) capsule 300 mg  300 mg Oral TID     isosorbide mononitrate  30 mg Oral QPM     sodium chloride (PF)  3 mL Intracatheter Q8H     enoxaparin  40 mg Subcutaneous Q24H     vancomycin (VANCOCIN) IV  1,500 mg Intravenous Q18H       Data     Recent Labs  Lab 09/08/17  1329   WBC 8.9   HGB 11.8*   MCV 92         POTASSIUM 4.4   CHLORIDE 105   CO2 28   BUN 14   CR 0.88   ANIONGAP 9   NELSON 8.8   *   ALBUMIN 3.4   PROTTOTAL 7.9   BILITOTAL 0.4   ALKPHOS 61   ALT 21   AST 13

## 2017-09-09 NOTE — PHARMACY
[facility]    Pharmacy      Antimicrobial Stewardship Note     Current antimicrobial therapy:  Anti-infectives (Future)    Start     Dose/Rate Route Frequency Ordered Stop    09/08/17 1530  vancomycin (VANCOCIN) 1,500 mg in NaCl 0.9 % 500 mL intermittent infusion      1,500 mg  376.7 mL/hr over 1.5 Hours Intravenous EVERY 18 HOURS 09/08/17 1421      09/08/17 1145  clindamycin (CLEOCIN) infusion 900 mg      900 mg  50 mL/hr over 60 Minutes Intravenous EVERY 8 HOURS 09/08/17 1147            Indication: SSTI     Pertinent labs:  Creatinine   Creatinine   Date Value Ref Range Status   09/08/2017 0.88 0.66 - 1.25 mg/dL Final   09/03/2016 0.93 0.66 - 1.25 mg/dL Final   11/20/2015 0.91 0.66 - 1.25 mg/dL Final     WBC   WBC   Date Value Ref Range Status   09/08/2017 8.9 4.0 - 11.0 10e9/L Final   09/03/2016 8.9 4.0 - 11.0 10e9/L Final   11/19/2015 7.4 4.0 - 11.0 10e9/L Final     ANC No components found for: ANC     Culture Results: pending     Recommendations/Interventions:  1. None at this time.    Elham Pereyra, [facility]  September 9, 2017

## 2017-09-09 NOTE — PROGRESS NOTES
Dominik was provided the Medicare IM letter which he voiced understanding and signed. He was also given the Senior Linkage Line booklet and VA benefit application and VSO number. When discussing the healthcare directive and presented with the written information he now declined it saying his daughter will make decisions for him; his wife added that the daughter is his POA and that they have written directives at home. They will bring it in when able.

## 2017-09-09 NOTE — PLAN OF CARE
Problem: Goal Outcome Summary  Goal: Goal Outcome Summary  Outcome: No Change  Redness to R foot has not exceeded marked borders this shift. Dressing intact. Temp max 100.3 this shift. Resolved with lightweight bedding and cool cloth to forehead. Denies any pain. IV abx continue. Vitals stable. Alert and oriented.     Face to face report given with opportunity to observe patient.     Report given to EUSEBIO Castaneda   9/9/2017  7:55 AM

## 2017-09-09 NOTE — PLAN OF CARE
Problem: Goal Outcome Summary  Goal: Goal Outcome Summary  Outcome: No Change  Patient alert and oriented throughout the day.  Has denied pain to right foot.  Afebrile throughout the day.  Other vital signs as documented.  Right pinky toe very swollen with 0.7x0.8cm wound to outer toe.  Toe cleaned with normal saline and wash cloth and gentle debriding.  Copious amounts of white thick drainage present from wound.  Does not have any odor to discharge.  Nonadherent dressing placed and then wrapped with gauze.  Redness remains within bordered areas.

## 2017-09-09 NOTE — PLAN OF CARE
Problem: Patient Goal: Social Work Focus  Goal: 1. Patient Goal: Social Work Focus  Outcome: No Change  Assessment done via flowsheet.     LOC: awake, alert and oriented X3  Others present: no one     Dx: cellulitis right foot     Primary PCP: Ban Ivy  Health Care Directive: would like the information  Pharmacy: mail order through [facility], otherwise uses [facility] in [place]     Living at:[place]  Lives with: wife Sandrita  Support System: wife and adult kids  Home/county services: none     Adequate Resources for needs (housing, utilities, food/med): yes     Meds and appointments management: independent     Transportation: drives as does his wife     Equipment used: cane, says he doesn't use it outside because he can't get any work done if he's hanging on to a cane  ADLs: independent  Ambulation: independent with a cane at times for balance  Falls: none     Household: independent with his wife  Community/social activity/work: independent     Mental health: denies concerns  Substance abuse: quit smoking 50 years ago, has an alcoholic drink about once/year     : yes, but says he has not been established nor connected with the VA, is willing to accept the VA benefits application and local VSO number  VA referral line called: no     Able to Return to Prior Living Arrangements: yes     Goals: return home with his wife     Barriers: none known at this time     PAUL: average

## 2017-09-10 LAB
ANION GAP SERPL CALCULATED.3IONS-SCNC: 8 MMOL/L (ref 3–14)
BUN SERPL-MCNC: 12 MG/DL (ref 7–30)
CALCIUM SERPL-MCNC: 8.2 MG/DL (ref 8.5–10.1)
CHLORIDE SERPL-SCNC: 110 MMOL/L (ref 94–109)
CO2 SERPL-SCNC: 24 MMOL/L (ref 20–32)
CREAT SERPL-MCNC: 0.82 MG/DL (ref 0.66–1.25)
CRP SERPL-MCNC: 52.8 MG/L (ref 0–8)
ERYTHROCYTE [DISTWIDTH] IN BLOOD BY AUTOMATED COUNT: 13.9 % (ref 10–15)
GFR SERPL CREATININE-BSD FRML MDRD: 89 ML/MIN/1.7M2
GLUCOSE SERPL-MCNC: 110 MG/DL (ref 70–99)
HCT VFR BLD AUTO: 32.1 % (ref 40–53)
HGB BLD-MCNC: 11.1 G/DL (ref 13.3–17.7)
MCH RBC QN AUTO: 31.4 PG (ref 26.5–33)
MCHC RBC AUTO-ENTMCNC: 34.6 G/DL (ref 31.5–36.5)
MCV RBC AUTO: 91 FL (ref 78–100)
PLATELET # BLD AUTO: 200 10E9/L (ref 150–450)
POTASSIUM SERPL-SCNC: 4 MMOL/L (ref 3.4–5.3)
RBC # BLD AUTO: 3.54 10E12/L (ref 4.4–5.9)
SODIUM SERPL-SCNC: 142 MMOL/L (ref 133–144)
VANCOMYCIN SERPL-MCNC: 15.7 MG/L
WBC # BLD AUTO: 7.1 10E9/L (ref 4–11)

## 2017-09-10 PROCEDURE — A9270 NON-COVERED ITEM OR SERVICE: HCPCS | Mod: GY | Performed by: FAMILY MEDICINE

## 2017-09-10 PROCEDURE — 85027 COMPLETE CBC AUTOMATED: CPT | Performed by: FAMILY MEDICINE

## 2017-09-10 PROCEDURE — 25000132 ZZH RX MED GY IP 250 OP 250 PS 637: Mod: GY | Performed by: FAMILY MEDICINE

## 2017-09-10 PROCEDURE — S0077 INJECTION, CLINDAMYCIN PHOSP: HCPCS | Performed by: FAMILY MEDICINE

## 2017-09-10 PROCEDURE — 36415 COLL VENOUS BLD VENIPUNCTURE: CPT | Performed by: FAMILY MEDICINE

## 2017-09-10 PROCEDURE — 25000128 H RX IP 250 OP 636: Performed by: FAMILY MEDICINE

## 2017-09-10 PROCEDURE — 12000000 ZZH R&B MED SURG/OB

## 2017-09-10 PROCEDURE — 86140 C-REACTIVE PROTEIN: CPT | Performed by: FAMILY MEDICINE

## 2017-09-10 PROCEDURE — 80048 BASIC METABOLIC PNL TOTAL CA: CPT | Performed by: FAMILY MEDICINE

## 2017-09-10 PROCEDURE — 80202 ASSAY OF VANCOMYCIN: CPT | Performed by: FAMILY MEDICINE

## 2017-09-10 PROCEDURE — 25000125 ZZHC RX 250: Performed by: FAMILY MEDICINE

## 2017-09-10 RX ORDER — CARVEDILOL 3.12 MG/1
3.12 TABLET ORAL ONCE
Status: COMPLETED | OUTPATIENT
Start: 2017-09-10 | End: 2017-09-10

## 2017-09-10 RX ADMIN — GABAPENTIN 300 MG: 300 CAPSULE ORAL at 06:22

## 2017-09-10 RX ADMIN — CLINDAMYCIN PHOSPHATE 900 MG: 18 INJECTION, SOLUTION INTRAVENOUS at 15:23

## 2017-09-10 RX ADMIN — CLINDAMYCIN PHOSPHATE 900 MG: 18 INJECTION, SOLUTION INTRAVENOUS at 21:26

## 2017-09-10 RX ADMIN — VANCOMYCIN HYDROCHLORIDE 1500 MG: 1 INJECTION, POWDER, LYOPHILIZED, FOR SOLUTION INTRAVENOUS at 02:57

## 2017-09-10 RX ADMIN — ASPIRIN 81 MG 81 MG: 81 TABLET ORAL at 06:22

## 2017-09-10 RX ADMIN — CLINDAMYCIN PHOSPHATE 900 MG: 18 INJECTION, SOLUTION INTRAVENOUS at 08:05

## 2017-09-10 RX ADMIN — ENOXAPARIN SODIUM 40 MG: 40 INJECTION SUBCUTANEOUS at 14:08

## 2017-09-10 RX ADMIN — CARVEDILOL 3.12 MG: 3.12 TABLET, FILM COATED ORAL at 20:37

## 2017-09-10 RX ADMIN — VANCOMYCIN HYDROCHLORIDE 1500 MG: 1 INJECTION, POWDER, LYOPHILIZED, FOR SOLUTION INTRAVENOUS at 22:10

## 2017-09-10 RX ADMIN — GABAPENTIN 300 MG: 300 CAPSULE ORAL at 20:37

## 2017-09-10 RX ADMIN — ISOSORBIDE MONONITRATE 30 MG: 30 TABLET, EXTENDED RELEASE ORAL at 20:38

## 2017-09-10 RX ADMIN — CARVEDILOL 3.12 MG: 3.12 TABLET, FILM COATED ORAL at 20:38

## 2017-09-10 RX ADMIN — GABAPENTIN 300 MG: 300 CAPSULE ORAL at 14:07

## 2017-09-10 RX ADMIN — CARVEDILOL 3.12 MG: 3.12 TABLET, FILM COATED ORAL at 08:03

## 2017-09-10 NOTE — PROGRESS NOTES
Dominik is awake and resting in bed doing word puzzles. He feels his foot is improving, but says it is still very painful to step on. He plans to return home with his wife at discharge. CTS will remain available.

## 2017-09-10 NOTE — PLAN OF CARE
Problem: Goal Outcome Summary  Goal: Goal Outcome Summary  Outcome: No Change  Patient has continued to be alert and oriented throughout the day.  Vital signs have been as documented, including blood pressure, 140's this morning, then 170's-190's this afternoon with heart rates in the 60's.  Dr. Aguirre updated and aware, an adjustment was made with his Coreg dose for this evening.  Right pinky toe remain very swollen and reddened with white, creamy drainage present.  Size of opening remains unchanged in size, but there is also sloughy skin on the outside of the toe that is present.  Pulses present in bilateral feet.  Redness remains within outlined boarders.  Denies any pain.  IV abx given as ordered.  Family here today and are supportive to patient.  Up to shower, amublates with cane back and forth to the restroom.

## 2017-09-10 NOTE — PLAN OF CARE
Patient Blood Pressure on assessment. 195/83,183/75 on recheck with sleeve up.  HR 63, at 1515.  Blood pressure upon recheck at 1425 175/70, HR 64.  Dr. Aguirre updated regarding.  NO's received.

## 2017-09-10 NOTE — PROGRESS NOTES
[facility]    Family Practice Progress Note    Date of Service (when I saw the patient): 09/10/2017     Assessment & Plan   Chino Harper is a 88 year old male who was admitted on 9/8/2017.     Active Problems:    Cellulitis and abscess of foot    Assessment: starting to improve    Plan: continue current medications      DVT Prophylaxis: Enoxaparin (Lovenox) SQ  Code Status: DNR / DNI    Shorty Aguirre    Interval History   Doing well. Continues to improve. Pain is well-controlled. No fevers. Edema seems to be decreasing. Erythema and swelling starting to recede.CRP still elevated, about the same. MRI not read yet. Hgb stable. Sugars controlled. 1/2 blood cultures positive for coag negative Staph aureus.       Review Of Systems  CONSTITUTIONAL:  negative for  fevers  HEENT:  negative  RESPIRATORY:  negative for cough or shortness of breath  CARDIOVASCULAR:  negative for  chest pain, palpitations  GASTROINTESTINAL:  negative for nausea, vomiting, diarrhea, constipation and abdominal pain  GENITOURINARY:  negative for frequency and dysuria  INTEGUMENT/BREAST:  positive for changes in lesion and erythema and swelling of lateral right foot  MUSCULOSKELETAL:  positive for  muscle weakness  BEHAVIOR/PSYCH:  negative for depressed mood    Physical Exam   Temp: 99.1  F (37.3  C) Temp src: Tympanic BP: 131/67   Heart Rate: 76 Resp: 18 SpO2: 94 % O2 Device: None (Room air)    Vitals:    09/08/17 1201   Weight: 77 kg (169 lb 12.1 oz)     Vital Signs with Ranges  Temp:  [98.7  F (37.1  C)-99.1  F (37.3  C)] 99.1  F (37.3  C)  Heart Rate:  [61-76] 76  Resp:  [16-18] 18  BP: (131-153)/(63-67) 131/67  SpO2:  [94 %-96 %] 94 %  I/O last 3 completed shifts:  In: 840 [P.O.:840]  Out: 900 [Urine:900]    Peripheral IV 09/09/17 Right Lower forearm (Active)   Site Assessment WDL 9/10/2017  8:15 AM   Line Status Infusing 9/10/2017  8:15 AM   Phlebitis Scale 0-->no symptoms 9/10/2017  8:15 AM   Infiltration Scale 0  9/10/2017  8:15 AM   Number of days:1       Wound 09/08/17 Lateral Foot Other (comment) lateral right foot reddened and dressing CDI to right small toe  (Active)   Site Assessment [facility] 9/10/2017  1:00 AM   Laura-wound Assessment [facility] 9/9/2017  3:47 PM   Size - Length x Width x Depth (cm) 0.8x0.7cm 9/9/2017 10:30 AM   Drainage Amount [facility] 9/9/2017  3:47 PM   Drainage Color/Charcteristics [facility] 9/9/2017  3:47 PM   Wound Care/Cleansing Normal saline 9/9/2017 10:30 AM   Dressing Non adherent;Dry gauze 9/9/2017 10:30 AM   Dressing Status Clean, dry, intact 9/10/2017  1:00 AM   Wound/Skin Edges (WOC) Attached edges 9/8/2017  4:17 PM   Number of days:2     Line/device assessment(s) completed for medical necessity    Constitutional: awake, alert, cooperative, no apparent distress, appears stated age and normal weight  ENT: normocepalic, without obvious abnormality  Respiratory: no increased work of breathing, good air exchange and clear to auscultation  Cardiovascular: regular rate and rhythm and no murmur noted  GI: normal bowel sounds, soft and non-tender  Skin: erythema and swelling of right foot  Musculoskeletal: full range of motion noted  Neuropsychiatric: General: normal, calm and normal eye contact  Level of consciousness: alert / normal  Affect: normal  Orientation: oriented to self, place, time and situation  Memory and insight: normal, memory for past and recent events intact and thought process normal    Medications        carvedilol  3.125 mg Oral BID w/meals     clindamycin  900 mg Intravenous Q8H     aspirin chewable tablet 81 mg  81 mg Oral Daily     gabapentin (NEURONTIN) capsule 300 mg  300 mg Oral TID     isosorbide mononitrate  30 mg Oral QPM     sodium chloride (PF)  3 mL Intracatheter Q8H     enoxaparin  40 mg Subcutaneous Q24H     vancomycin (VANCOCIN) IV  1,500 mg Intravenous Q18H       Data     Recent Labs  Lab 09/10/17  0522 09/08/17  1329   WBC 7.1 8.9   HGB 11.1* 11.8*   MCV 91 92    199   NA  142 142   POTASSIUM 4.0 4.4   CHLORIDE 110* 105   CO2 24 28   BUN 12 14   CR 0.82 0.88   ANIONGAP 8 9   NELSON 8.2* 8.8   * 113*   ALBUMIN  --  3.4   PROTTOTAL  --  7.9   BILITOTAL  --  0.4   ALKPHOS  --  61   ALT  --  21   AST  --  13

## 2017-09-10 NOTE — PLAN OF CARE
Problem: Goal Outcome Summary  Goal: Goal Outcome Summary  Outcome: No Change  R foot remains reddened, warm, slightly edematous. Redness has not exceeded marked borders. Dressing to R pinky toe CDI. Temp max 99.1 this shift. Vitals otherwise stable. Alert and oriented. Voiding adequate amounts in urinal. IV Vanco and Cleocin continue.     Face to face report given with opportunity to observe patient.    Report given to EUSEBIO Castaneda   9/10/2017  7:15 AM

## 2017-09-10 NOTE — PHARMACY
[facility]    Pharmacy      Antimicrobial Stewardship Note     Current antimicrobial therapy:  Anti-infectives (Future)    Start     Dose/Rate Route Frequency Ordered Stop    09/08/17 1530  vancomycin (VANCOCIN) 1,500 mg in NaCl 0.9 % 500 mL intermittent infusion      1,500 mg  376.7 mL/hr over 1.5 Hours Intravenous EVERY 18 HOURS 09/08/17 1421      09/08/17 1145  clindamycin (CLEOCIN) infusion 900 mg      900 mg  50 mL/hr over 60 Minutes Intravenous EVERY 8 HOURS 09/08/17 1147            Indication: SSTI     Pertinent labs:  Creatinine   Creatinine   Date Value Ref Range Status   09/10/2017 0.82 0.66 - 1.25 mg/dL Final   09/08/2017 0.88 0.66 - 1.25 mg/dL Final   09/03/2016 0.93 0.66 - 1.25 mg/dL Final     WBC   WBC   Date Value Ref Range Status   09/10/2017 7.1 4.0 - 11.0 10e9/L Final   09/08/2017 8.9 4.0 - 11.0 10e9/L Final   09/03/2016 8.9 4.0 - 11.0 10e9/L Final     ANC No components found for: ANC     Culture Results: pending     Recommendations/Interventions:  1. None at this time.    Elahm Pereyra, [facility]  September 10, 2017

## 2017-09-11 PROBLEM — I10 BENIGN ESSENTIAL HYPERTENSION: Chronic | Status: ACTIVE | Noted: 2017-09-11

## 2017-09-11 LAB
CREAT SERPL-MCNC: 0.82 MG/DL (ref 0.66–1.25)
GFR SERPL CREATININE-BSD FRML MDRD: 89 ML/MIN/1.7M2
PLATELET # BLD AUTO: 225 10E9/L (ref 150–450)

## 2017-09-11 PROCEDURE — 85049 AUTOMATED PLATELET COUNT: CPT | Performed by: FAMILY MEDICINE

## 2017-09-11 PROCEDURE — A9270 NON-COVERED ITEM OR SERVICE: HCPCS | Mod: GY | Performed by: FAMILY MEDICINE

## 2017-09-11 PROCEDURE — 25000132 ZZH RX MED GY IP 250 OP 250 PS 637: Mod: GY | Performed by: FAMILY MEDICINE

## 2017-09-11 PROCEDURE — 25000128 H RX IP 250 OP 636: Performed by: FAMILY MEDICINE

## 2017-09-11 PROCEDURE — 25000125 ZZHC RX 250: Performed by: FAMILY MEDICINE

## 2017-09-11 PROCEDURE — 36415 COLL VENOUS BLD VENIPUNCTURE: CPT | Performed by: FAMILY MEDICINE

## 2017-09-11 PROCEDURE — 82565 ASSAY OF CREATININE: CPT | Performed by: FAMILY MEDICINE

## 2017-09-11 PROCEDURE — 12000000 ZZH R&B MED SURG/OB

## 2017-09-11 PROCEDURE — S0077 INJECTION, CLINDAMYCIN PHOSP: HCPCS | Performed by: FAMILY MEDICINE

## 2017-09-11 RX ORDER — CARVEDILOL 3.12 MG/1
6.25 TABLET ORAL 2 TIMES DAILY WITH MEALS
Status: DISCONTINUED | OUTPATIENT
Start: 2017-09-11 | End: 2017-09-12

## 2017-09-11 RX ORDER — CARVEDILOL 3.12 MG/1
3.12 TABLET ORAL ONCE
Status: COMPLETED | OUTPATIENT
Start: 2017-09-11 | End: 2017-09-11

## 2017-09-11 RX ADMIN — VANCOMYCIN HYDROCHLORIDE 1500 MG: 1 INJECTION, POWDER, LYOPHILIZED, FOR SOLUTION INTRAVENOUS at 16:33

## 2017-09-11 RX ADMIN — CLINDAMYCIN PHOSPHATE 900 MG: 18 INJECTION, SOLUTION INTRAVENOUS at 21:38

## 2017-09-11 RX ADMIN — GABAPENTIN 300 MG: 300 CAPSULE ORAL at 20:26

## 2017-09-11 RX ADMIN — ENOXAPARIN SODIUM 40 MG: 40 INJECTION SUBCUTANEOUS at 13:38

## 2017-09-11 RX ADMIN — COLLAGENASE SANTYL: 250 OINTMENT TOPICAL at 13:30

## 2017-09-11 RX ADMIN — GABAPENTIN 300 MG: 300 CAPSULE ORAL at 06:22

## 2017-09-11 RX ADMIN — CLINDAMYCIN PHOSPHATE 900 MG: 18 INJECTION, SOLUTION INTRAVENOUS at 06:22

## 2017-09-11 RX ADMIN — ISOSORBIDE MONONITRATE 30 MG: 30 TABLET, EXTENDED RELEASE ORAL at 20:26

## 2017-09-11 RX ADMIN — GABAPENTIN 300 MG: 300 CAPSULE ORAL at 13:36

## 2017-09-11 RX ADMIN — CARVEDILOL 3.12 MG: 3.12 TABLET, FILM COATED ORAL at 13:35

## 2017-09-11 RX ADMIN — CLINDAMYCIN PHOSPHATE 900 MG: 18 INJECTION, SOLUTION INTRAVENOUS at 13:37

## 2017-09-11 RX ADMIN — ASPIRIN 81 MG 81 MG: 81 TABLET ORAL at 06:22

## 2017-09-11 RX ADMIN — CARVEDILOL 3.12 MG: 3.12 TABLET, FILM COATED ORAL at 06:22

## 2017-09-11 RX ADMIN — CARVEDILOL 6.25 MG: 3.12 TABLET, FILM COATED ORAL at 20:26

## 2017-09-11 NOTE — PROGRESS NOTES
[facility]  Progress Note            Subjective:   Toe feels better. Able to bear weight easier       Medications:     Current Facility-Administered Medications Ordered in Epic   Medication Dose Route Frequency Last Rate Last Dose     carvedilol (COREG) tablet 3.125 mg  3.125 mg Oral BID w/meals   3.125 mg at 09/11/17 0622     clindamycin (CLEOCIN) infusion 900 mg  900 mg Intravenous Q8H 50 mL/hr at 09/11/17 0622 900 mg at 09/11/17 0622     aspirin chewable tablet 81 mg  81 mg Oral Daily   81 mg at 09/11/17 0622     calcium carbonate (TUMS) chewable tablet 500 mg  500 mg Oral TID PRN         gabapentin (NEURONTIN) capsule 300 mg  300 mg Oral TID   300 mg at 09/11/17 0622     isosorbide mononitrate (IMDUR) 24 hr tablet 30 mg  30 mg Oral QPM   30 mg at 09/10/17 2038     naloxone (NARCAN) injection 0.1-0.4 mg  0.1-0.4 mg Intravenous Q2 Min PRN         lidocaine 1 % 1 mL  1 mL Other Q1H PRN         lidocaine (LMX4) kit   Topical Q1H PRN         sodium chloride (PF) 0.9% PF flush 3 mL  3 mL Intracatheter Q1H PRN         sodium chloride (PF) 0.9% PF flush 3 mL  3 mL Intracatheter Q8H   3 mL at 09/10/17 1408     enoxaparin (LOVENOX) injection 40 mg  40 mg Subcutaneous Q24H   40 mg at 09/10/17 1408     acetaminophen (TYLENOL) tablet 650 mg  650 mg Oral Q4H PRN         HYDROcodone-acetaminophen (NORCO) 5-325 MG per tablet 1-2 tablet  1-2 tablet Oral Q4H PRN         ondansetron (ZOFRAN-ODT) ODT tab 4 mg  4 mg Oral Q6H PRN        Or     ondansetron (ZOFRAN) injection 4 mg  4 mg Intravenous Q6H PRN         vancomycin (VANCOCIN) 1,500 mg in NaCl 0.9 % 500 mL intermittent infusion  1,500 mg Intravenous Q18H 376.7 mL/hr at 09/10/17 2210 1,500 mg at 09/10/17 2210     No current [facility]-ordered outpatient prescriptions on file.       Review of Systems:   C: NEGATIVE for fever, chills, change in weight  E/M: NEGATIVE for ear, mouth and throat problems  R: NEGATIVE for significant cough or SOB  CV: NEGATIVE for chest pain,  palpitations or peripheral edema               Physical Exam:   Vitals were reviewed  Patient Vitals for the past 24 hrs:   BP Temp Temp src Heart Rate Resp SpO2   09/11/17 0615 146/66 - - 59 - 97 %   09/11/17 0207 133/71 98.8  F (37.1  C) Tympanic 60 20 95 %   09/10/17 2030 166/79 99  F (37.2  C) Tympanic 77 - 97 %   09/10/17 1626 175/70 - - 64 18 -   09/10/17 1519 (!) 183/75 - - 64 - -   09/10/17 1517 (!) 195/83 98.4  F (36.9  C) Tympanic 64 18 98 %   09/10/17 0800 143/70 98.1  F (36.7  C) Tympanic 68 18 98 %     Constitutional: Awake, alert, cooperative.  Eyes:  sclera clear  Lungs: No increased work of breathing, good air exchange, clear to auscultation bilaterally, no crackles or wheezing.  Cardiovascular: Regular rate and rhythm, unchanged slight murmur  Abdomen: normal bowel sounds, soft, non-distended, non-tender, no masses palpated,   Neurologic: Awake, alert, oriented to name, place and time.    Neuropsychiatric: Normal affect, mood, orientation, memory and insight.  Skin: foot much less edema and erythema compared to Friday. Drainage continues    Peripheral IV 09/11/17 Left Lower forearm (Active)   Site Assessment WDL 9/11/2017  6:00 AM   Line Status Infusing 9/11/2017  6:00 AM   Phlebitis Scale 0-->no symptoms 9/11/2017  6:00 AM   Infiltration Scale 0 9/11/2017  6:00 AM   Number of days:0       Wound 09/08/17 Lateral Foot Other (comment) lateral right foot reddened and dressing CDI to right small toe  (Active)   Site Assessment [facility] 9/10/2017 11:15 PM   Laura-wound Assessment [facility] 9/10/2017  3:17 PM   Size - Length x Width x Depth (cm) 0.8x0.7cm 9/9/2017 10:30 AM   Drainage Amount None 9/10/2017  3:17 PM   Drainage Color/Charcteristics [facility] 9/10/2017  3:17 PM   Wound Care/Cleansing Normal saline 9/10/2017 11:45 AM   Dressing Non adherent;Dry gauze 9/10/2017 11:45 AM   Dressing Status Clean, dry, intact 9/10/2017 11:15 PM   Wound/Skin Edges (WOC) Attached edges 9/8/2017  4:17 PM   Number of days:3      Line/device assessment(s) completed for medical necessity         Data:     Results for orders placed or performed during the hospital encounter of 09/08/17 (from the past 24 hour(s))   Vancomycin level   Result Value Ref Range    Vancomycin Level 15.7 mg/L   Platelet count   Result Value Ref Range    Platelet Count 225 150 - 450 10e9/L   Creatinine   Result Value Ref Range    Creatinine 0.82 0.66 - 1.25 mg/dL    GFR Estimate 89 >60 mL/min/1.7m2    GFR Estimate If Black >90 >60 mL/min/1.7m2     All cardiac studies reviewed by me.  All imaging studies reviewed by me.         Assessment and Plan:   Active Problems:    Cellulitis and abscess of foot   cont iv antibiotics. +wc from clinic shows staph aureus. Will add santyl topical with dressing changes    Benign essential hypertension    bp elev. See orders

## 2017-09-11 NOTE — DISCHARGE INSTRUCTIONS
MRI Contrast Discharge Instructions    The IV contrast you received today will pass out of your body in your  urine. This will happen in the next 24 hours. You will not feel this process.  Your urine will not change color.    Drink at least 4 extra glasses of water or juice today (unless your doctor  has restricted your fluids). This reduces the stress on your kidneys.  You may take your regular medicines.    If you are on dialysis: It is best to have dialysis today.    If you have a reaction: Most reactions happen right away. If you have  any new symptoms after leaving the hospital (such as hives or swelling),  call your hospital at the correct number below. Or call your family doctor.  If you have breathing distress or wheezing, call 911.    Special instructions: ***    I have read and understand the above information.    Signature:______________________________________ Date:______5-[birth year]_____    Staff:__________________________________________ Date:___________     Time:__________    [place] [facility] Departments:    ___[place]: [phone]  ___[facility]: [phone]  ___[place]: [phone] ___[place]: [phone]  ___[facility]: [phone]  ___[facility]: [phone]  ___[place]: [phone]  ___[facility]: [phone]  ___[place]: [phone]      YOU HAVE AN APPOINTMENT SET UP WITH DR BREWSTER AT THE [facility] [place] , Wednesday AT 10:30.

## 2017-09-11 NOTE — PHARMACY-VANCOMYCIN DOSING SERVICE
Pharmacy Vancomycin Note  Date of Service 2017  Patient's  [id number]   88 year old, male    Indication: Skin and Soft Tissue Infection  Goal Trough Level: 15-20 mg/L  Day of Therapy: 4  Current Vancomycin regimen:  1500 mg IV q18h    Current estimated CrCl = Estimated Creatinine Clearance: 67.8 mL/min (based on Cr of 0.82).    Creatinine for last 3 days  2017:  1:29 PM Creatinine 0.88 mg/dL  9/10/2017:  5:22 AM Creatinine 0.82 mg/dL  2017:  5:35 AM Creatinine 0.82 mg/dL    Recent Vancomycin Levels (past 3 days)  9/10/2017:  8:31 PM Vancomycin Level 15.7 mg/L    Vancomycin IV Administrations (past 72 hours)                   vancomycin (VANCOCIN) 1,500 mg in NaCl 0.9 % 500 mL intermittent infusion (mg) 1,500 mg New Bag 09/10/17 2210     1,500 mg New Bag  0257     1,500 mg New Bag 17 0917     1,500 mg Restarted 17 1604     1,500 mg New Bag  1447                Nephrotoxins and other renal medications (Future)    Start     Dose/Rate Route Frequency Ordered Stop    17 1530  vancomycin (VANCOCIN) 1,500 mg in NaCl 0.9 % 500 mL intermittent infusion      1,500 mg  376.7 mL/hr over 1.5 Hours Intravenous EVERY 18 HOURS 17 1421               Contrast Orders - past 72 hours (72h ago through future)    Start     Dose/Rate Route Frequency Ordered Stop    17 1515  gadobutrol (GADAVIST) injection 7.5 mL      7.5 mL Intravenous ONCE 17 1510 17 1542          Interpretation of levels and current regimen:  Trough level is  Therapeutic    Has serum creatinine changed > 50% in last 72 hours: No    Renal Function: Stable    Plan:  1. Continue Current Dose  2. Pharmacy will check trough levels as appropriate in 1-3 Days.    3. Serum creatinine levels will be ordered daily for the first week of therapy and at least twice weekly for subsequent weeks.      Kim Han RPh        .

## 2017-09-11 NOTE — PLAN OF CARE
Problem: Goal Outcome Summary  Goal: Goal Outcome Summary  Outcome: No Change  Vitals have remained stable this shift. Afebrile. Additional dose of Coreg given last evening for elevated BP's, BP's trending downward thru shift. Remains alert, oriented, pleasant. Dressing to R pinky toe is CDI. Redness has not exceeded marked borders. Voiding adequate amounts in urinal.     Face to face report given with opportunity to observe patient.    Report given to EUSEBIO Castaneda   9/11/2017  7:28 AM

## 2017-09-11 NOTE — PLAN OF CARE
Face to face report given with opportunity to observe patient.    Report given to EUSEBIO Wagner   9/10/2017  7:10 PM

## 2017-09-11 NOTE — PHARMACY
[facility]    Pharmacy      Antimicrobial Stewardship Note     Current antimicrobial therapy:  Anti-infectives (Future)    Start     Dose/Rate Route Frequency Ordered Stop    09/08/17 1530  vancomycin (VANCOCIN) 1,500 mg in NaCl 0.9 % 500 mL intermittent infusion      1,500 mg  376.7 mL/hr over 1.5 Hours Intravenous EVERY 18 HOURS 09/08/17 1421      09/08/17 1145  clindamycin (CLEOCIN) infusion 900 mg      900 mg  50 mL/hr over 60 Minutes Intravenous EVERY 8 HOURS 09/08/17 1147            Indication: SSTI     Pertinent labs:  Creatinine   Creatinine   Date Value Ref Range Status   09/11/2017 0.82 0.66 - 1.25 mg/dL Final   09/10/2017 0.82 0.66 - 1.25 mg/dL Final   09/08/2017 0.88 0.66 - 1.25 mg/dL Final     WBC   WBC   Date Value Ref Range Status   09/10/2017 7.1 4.0 - 11.0 10e9/L Final   09/08/2017 8.9 4.0 - 11.0 10e9/L Final   09/03/2016 8.9 4.0 - 11.0 10e9/L Final     ANC No components found for: ANC     Culture Results: pending     Recommendations/Interventions:  1. None at this time.    Elham Pereyra, [facility]  September 11, 2017

## 2017-09-12 LAB
CREAT SERPL-MCNC: 0.88 MG/DL (ref 0.66–1.25)
GFR SERPL CREATININE-BSD FRML MDRD: 82 ML/MIN/1.7M2

## 2017-09-12 PROCEDURE — S0077 INJECTION, CLINDAMYCIN PHOSP: HCPCS | Performed by: FAMILY MEDICINE

## 2017-09-12 PROCEDURE — 25000125 ZZHC RX 250: Performed by: FAMILY MEDICINE

## 2017-09-12 PROCEDURE — 82565 ASSAY OF CREATININE: CPT | Performed by: FAMILY MEDICINE

## 2017-09-12 PROCEDURE — 25000132 ZZH RX MED GY IP 250 OP 250 PS 637: Mod: GY | Performed by: FAMILY MEDICINE

## 2017-09-12 PROCEDURE — 25000128 H RX IP 250 OP 636: Performed by: FAMILY MEDICINE

## 2017-09-12 PROCEDURE — 12000000 ZZH R&B MED SURG/OB

## 2017-09-12 PROCEDURE — 36415 COLL VENOUS BLD VENIPUNCTURE: CPT | Performed by: FAMILY MEDICINE

## 2017-09-12 PROCEDURE — A9270 NON-COVERED ITEM OR SERVICE: HCPCS | Mod: GY | Performed by: FAMILY MEDICINE

## 2017-09-12 RX ORDER — CARVEDILOL 3.12 MG/1
3.12 TABLET ORAL 2 TIMES DAILY WITH MEALS
Status: DISCONTINUED | OUTPATIENT
Start: 2017-09-12 | End: 2017-09-13 | Stop reason: HOSPADM

## 2017-09-12 RX ADMIN — ISOSORBIDE MONONITRATE 30 MG: 30 TABLET, EXTENDED RELEASE ORAL at 20:09

## 2017-09-12 RX ADMIN — GABAPENTIN 300 MG: 300 CAPSULE ORAL at 14:14

## 2017-09-12 RX ADMIN — GABAPENTIN 300 MG: 300 CAPSULE ORAL at 06:28

## 2017-09-12 RX ADMIN — CLINDAMYCIN PHOSPHATE 900 MG: 18 INJECTION, SOLUTION INTRAVENOUS at 14:17

## 2017-09-12 RX ADMIN — CLINDAMYCIN PHOSPHATE 900 MG: 18 INJECTION, SOLUTION INTRAVENOUS at 21:30

## 2017-09-12 RX ADMIN — CLINDAMYCIN PHOSPHATE 900 MG: 18 INJECTION, SOLUTION INTRAVENOUS at 06:26

## 2017-09-12 RX ADMIN — ENOXAPARIN SODIUM 40 MG: 40 INJECTION SUBCUTANEOUS at 14:17

## 2017-09-12 RX ADMIN — COLLAGENASE SANTYL: 250 OINTMENT TOPICAL at 10:51

## 2017-09-12 RX ADMIN — CARVEDILOL 3.12 MG: 3.12 TABLET, FILM COATED ORAL at 09:33

## 2017-09-12 RX ADMIN — GABAPENTIN 300 MG: 300 CAPSULE ORAL at 20:09

## 2017-09-12 RX ADMIN — CARVEDILOL 3.12 MG: 3.12 TABLET, FILM COATED ORAL at 20:08

## 2017-09-12 RX ADMIN — ASPIRIN 81 MG 81 MG: 81 TABLET ORAL at 06:28

## 2017-09-12 NOTE — PLAN OF CARE
Problem: Goal Outcome Summary  Goal: Goal Outcome Summary  Outcome: No Change  Alert and oriented. Temp- 99.2 all other VSS. C/o 2/10 pain in R toe and declined tylenol. CMS intact RLE. Edema 2+ R foot and ankle. IV- Saline locked. ABX- Vanco and Cleocin. Coreg 6.25mg administered, re chaeck blood pressure 121/59. Using urinal to void. Bed alarms on and audible.      Face to face report given with opportunity to observe patient.     Report given to EUSEBIO Ferrari and EUSEBIO Bailey   9/11/2017  11:45 PM

## 2017-09-12 NOTE — PHARMACY
[facility]    Pharmacy      Antimicrobial Stewardship Note     Current antimicrobial therapy:  Anti-infectives (Future)    Start     Dose/Rate Route Frequency Ordered Stop    09/08/17 1145  clindamycin (CLEOCIN) infusion 900 mg      900 mg  50 mL/hr over 60 Minutes Intravenous EVERY 8 HOURS 09/08/17 1147          Indication: SSTI      Pertinent labs:  Creatinine   Creatinine   Date Value Ref Range Status   09/12/2017 0.88 0.66 - 1.25 mg/dL Final   09/11/2017 0.82 0.66 - 1.25 mg/dL Final   09/10/2017 0.82 0.66 - 1.25 mg/dL Final     WBC   WBC   Date Value Ref Range Status   09/10/2017 7.1 4.0 - 11.0 10e9/L Final   09/08/2017 8.9 4.0 - 11.0 10e9/L Final   09/03/2016 8.9 4.0 - 11.0 10e9/L Final     ANC No components found for: ANC     Culture Results:    Procedure Component Value Units Date/Time       Blood culture [[id number]] Collected: 09/08/17 1249       Order Status: Completed Specimen: Blood Updated: 09/12/17 0632        Specimen Description Blood Left Arm        Culture Micro No growth after 4 days       Blood culture [[id number]] Collected: 09/08/17 1329       Order Status: Completed Specimen: Blood Updated: 09/12/17 0632        Specimen Description Blood Right Hand        Special Requests 2ND BOTTLE DRAWN AT 1450 RARM        Culture Micro No growth after 4 days       Active MRSA Surveillance Culture [[id number]] Collected: 09/08/17 1426       Order Status: Completed Specimen: Nares from Nose Updated: 09/09/17 1223        Specimen Description Nares        Culture Micro No MRSA isolated         Recommendations/Interventions:  1. No recommendations at this time. Current antimicrobial therapy is appropriate.     Delia Castanon  September 12, 2017

## 2017-09-12 NOTE — PLAN OF CARE
"Problem: Goal Outcome Summary  Goal: Goal Outcome Summary  Outcome: No Change  Reason for hospital stay:  Staph Infection, Osteomyelitis     Most recent vitals: /53  Temp 98.6  F (37  C) (Tympanic)  Resp 16  Ht 1.753 m (5' 9\")  Wt 77 kg (169 lb 12.1 oz)  SpO2 94%  BMI 25.07 kg/m2     Pain Management:  Denies any pain this shift     Orientation:  A+O x4     Respiratory:  Lungs clear - sats 94% on RA     :  Voiding clear yellow urine without difficulty - uses urinal      Skin Issues:  Dressing CDI to rt small toe. No drainage noted. Red areas on rt foot remain within marked borders     ABX:  Continues with IV Vanco and IV Cleocin     Safety:  Fall alarms remain on - calls for help appropriately      9/12/2017  6:56 AM  Vega Carrizales           Problem: Infection, Risk/Actual (Adult)  Goal: Identify Related Risk Factors and Signs and Symptoms  Related risk factors and signs and symptoms are identified upon initiation of Human Response Clinical Practice Guideline (CPG)   Outcome: No Change    09/12/17 0654   Infection, Risk/Actual   Infection, Risk/Actual: Related Risk Factors age extremes;skin integrity impairment;tissue perfusion altered   Signs and Symptoms (Infection, Risk/Actual) edema;cultures positive       Goal: Infection Prevention/Resolution  Patient will demonstrate the desired outcomes by discharge/transition of care.   Outcome: No Change    09/12/17 0654   Infection, Risk/Actual (Adult)   Infection Prevention/Resolution making progress toward outcome           "

## 2017-09-12 NOTE — PLAN OF CARE
Face to face report given with opportunity to observe patient.    Report given to Tonya Carrizales   9/12/2017  7:31 AM

## 2017-09-12 NOTE — PLAN OF CARE
Face to face report given with opportunity to observe patient.    Dressing change done at 10:30 am on R fifth toe.  Washed with NS and removed loose skin. Toe was swollen, pink and warm.  Expressed copious amounts of collagen tinged with blood. Applied Collagenese ointment over entire toe, dressed with 2 x 3 non-adherent pad, then wrapped with gauze.  The swelling decreased significantly after wound care.  Patient tolerated procedure quite well and denies pain.  Pt. Verbalized feeling a slight burning with application of Collagenese.    Report given to Tonya Shine   9/12/2017  12:00 PM

## 2017-09-12 NOTE — PLAN OF CARE
Problem: Goal Outcome Summary  Goal: Goal Outcome Summary  Outcome: Improving  Patient alert and oriented x3.  Vital signs as in graphics.  Afebrile.  Right pinky toe remains swollen, with pink creamy drainage present.  Minimally warm, pink, but remains within outlined boarders.  Dressing change completed with newly ordered sentyl.  Patient and family are aware that the infection has moved into the bone of the fifth digit.  Dr. Annalee Tobar has set up an appointment with Dr Freeman at the [facility] in [place] Wednesday morning at 10:30.  She will discharge him early Wednesday morning.  Will continue with antibiotics through then.  IV infusing without incident.  Up in room with cane and SBA to the bathroom and transferring to chair at bedside.

## 2017-09-12 NOTE — PLAN OF CARE
Problem: Goal Outcome Summary  Goal: Goal Outcome Summary  Outcome: No Change  Patient has remained alert and oriented throughout the day.  Has had some minimal pain to right foot.  Has denied any need for pain medication.  Right baby toe remains very swollen, but less in comparison to previous days.  Dressing change completed by nursing student as ordered.  Patient discharging tomorrow with follow up appointment with surgeon, Dr. Freeman.  Is aware that she may recommend amputation, but reminded patient and wife that the appointment is for a consultation only.  Paperwork provided to me from Dr. Annalee Tobar given to patient wife, Sandrita.

## 2017-09-12 NOTE — PROGRESS NOTES
[facility]  Progress Note            Subjective:   Doing ok. No pain with toe feels better. Swelling slow to improve from toe but dramatically better with regards to foot. No cp/sob                 Medications:     Current Facility-Administered Medications Ordered in Epic   Medication Dose Route Frequency Last Rate Last Dose     carvedilol (COREG) tablet 6.25 mg  6.25 mg Oral BID w/meals   6.25 mg at 09/11/17 2026     collagenase ointment   Topical Daily         clindamycin (CLEOCIN) infusion 900 mg  900 mg Intravenous Q8H 50 mL/hr at 09/12/17 0626 900 mg at 09/12/17 0626     aspirin chewable tablet 81 mg  81 mg Oral Daily   81 mg at 09/12/17 0628     calcium carbonate (TUMS) chewable tablet 500 mg  500 mg Oral TID PRN         gabapentin (NEURONTIN) capsule 300 mg  300 mg Oral TID   300 mg at 09/12/17 0628     isosorbide mononitrate (IMDUR) 24 hr tablet 30 mg  30 mg Oral QPM   30 mg at 09/11/17 2026     naloxone (NARCAN) injection 0.1-0.4 mg  0.1-0.4 mg Intravenous Q2 Min PRN         lidocaine 1 % 1 mL  1 mL Other Q1H PRN         lidocaine (LMX4) kit   Topical Q1H PRN         sodium chloride (PF) 0.9% PF flush 3 mL  3 mL Intracatheter Q1H PRN         sodium chloride (PF) 0.9% PF flush 3 mL  3 mL Intracatheter Q8H   3 mL at 09/11/17 1337     enoxaparin (LOVENOX) injection 40 mg  40 mg Subcutaneous Q24H   40 mg at 09/11/17 1338     acetaminophen (TYLENOL) tablet 650 mg  650 mg Oral Q4H PRN         HYDROcodone-acetaminophen (NORCO) 5-325 MG per tablet 1-2 tablet  1-2 tablet Oral Q4H PRN         ondansetron (ZOFRAN-ODT) ODT tab 4 mg  4 mg Oral Q6H PRN        Or     ondansetron (ZOFRAN) injection 4 mg  4 mg Intravenous Q6H PRN         vancomycin (VANCOCIN) 1,500 mg in NaCl 0.9 % 500 mL intermittent infusion  1,500 mg Intravenous Q18H 376.7 mL/hr at 09/11/17 1633 1,500 mg at 09/11/17 1633     No current Psychiatric-ordered outpatient prescriptions on file.       Review of Systems:   C: NEGATIVE for fever, chills,  change in weight  E/M: NEGATIVE for ear, mouth and throat problems  R: NEGATIVE for significant cough or SOB  CV: NEGATIVE for chest pain, palpitations or peripheral edema               Physical Exam:   Vitals were reviewed  Patient Vitals for the past 24 hrs:   BP Temp Temp src Heart Rate Resp SpO2   09/12/17 0147 105/53 98.6  F (37  C) Tympanic 58 16 94 %   09/11/17 2113 121/59 - - 67 - -   09/11/17 2020 143/62 99.2  F (37.3  C) Tympanic 70 18 97 %   09/11/17 1626 - - - - - 99 %   09/11/17 1624 154/75 98.5  F (36.9  C) Tympanic 65 18 -   09/11/17 1335 156/73 - - 63 - -   09/11/17 0910 138/57 97.5  F (36.4  C) Tympanic 61 20 98 %     Constitutional: Awake, alert, cooperative.  Eyes:  sclera clear  Lungs: No increased work of breathing, good air exchange, clear to auscultation bilaterally, no crackles or wheezing.  Cardiovascular: Regular rate and rhythm  Abdomen: normal bowel sounds, soft, non-distended, non-tender, no masses palpated,   Neurologic: Awake, alert, oriented to name, place and time.    Neuropsychiatric: Normal affect, mood, orientation, memory and insight.  Ext-edema to foot is gone. Not +2. Erythema regressing. Toe/open draining area slightly improved with use santyl, less edematous toe    Peripheral IV 09/11/17 Left Lower forearm (Active)   Site Assessment WDL 9/12/2017  2:00 AM   Line Status Saline locked 9/12/2017  2:00 AM   Phlebitis Scale 0-->no symptoms 9/12/2017  2:00 AM   Infiltration Scale 0 9/12/2017  2:00 AM   Number of days:1       Wound 09/08/17 Lateral Foot Other (comment) lateral right foot reddened and dressing CDI to right small toe  (Active)   Site Assessment RUST 9/12/2017  1:47 AM   Laura-wound Assessment RUST 9/12/2017  1:47 AM   Size - Length x Width x Depth (cm) 0.8x0.7cm 9/9/2017 10:30 AM   Drainage Amount RUST 9/12/2017  1:47 AM   Drainage Color/Charcteristics UTV 9/12/2017  1:47 AM   Wound Care/Cleansing Normal saline;Other (Comment) 9/11/2017  1:30 PM   Dressing Non  adherent;Dry gauze 9/11/2017  1:30 PM   Dressing Status Clean, dry, intact 9/12/2017  1:47 AM   Wound/Skin Edges (WOC) Attached edges 9/8/2017  4:17 PM   Number of days:4     Line/device assessment(s) completed for medical necessity         Data:     Results for orders placed or performed during the hospital encounter of 09/08/17 (from the past 24 hour(s))   Creatinine   Result Value Ref Range    Creatinine 0.88 0.66 - 1.25 mg/dL    GFR Estimate 82 >60 mL/min/1.7m2    GFR Estimate If Black >90 >60 mL/min/1.7m2     All cardiac studies reviewed by me.  All imaging studies reviewed by me.         Assessment and Plan:   Active Problems:    Cellulitis and abscess of foot   staph aureus. Sensitive to all antibiotics. Will stop vanco. Cont clinda iv. Anticipate discharge tomorrow. Will see Dr. Freeman given osteomyelitis on mri and possible need for amputation.    Benign essential hypertension  bp dose increased yesterday and now bp too low

## 2017-09-13 VITALS
HEART RATE: 74 BPM | BODY MASS INDEX: 25.14 KG/M2 | DIASTOLIC BLOOD PRESSURE: 79 MMHG | OXYGEN SATURATION: 95 % | RESPIRATION RATE: 20 BRPM | TEMPERATURE: 98.7 F | HEIGHT: 69 IN | WEIGHT: 169.75 LBS | SYSTOLIC BLOOD PRESSURE: 146 MMHG

## 2017-09-13 LAB
CREAT SERPL-MCNC: 0.86 MG/DL (ref 0.66–1.25)
GFR SERPL CREATININE-BSD FRML MDRD: 83 ML/MIN/1.7M2

## 2017-09-13 PROCEDURE — A9270 NON-COVERED ITEM OR SERVICE: HCPCS | Mod: GY | Performed by: FAMILY MEDICINE

## 2017-09-13 PROCEDURE — 82565 ASSAY OF CREATININE: CPT | Performed by: FAMILY MEDICINE

## 2017-09-13 PROCEDURE — 36415 COLL VENOUS BLD VENIPUNCTURE: CPT | Performed by: FAMILY MEDICINE

## 2017-09-13 PROCEDURE — S0077 INJECTION, CLINDAMYCIN PHOSP: HCPCS | Performed by: FAMILY MEDICINE

## 2017-09-13 PROCEDURE — 25000125 ZZHC RX 250: Performed by: FAMILY MEDICINE

## 2017-09-13 PROCEDURE — 25000132 ZZH RX MED GY IP 250 OP 250 PS 637: Mod: GY | Performed by: FAMILY MEDICINE

## 2017-09-13 RX ORDER — CLINDAMYCIN HCL 300 MG
300 CAPSULE ORAL 3 TIMES DAILY
Qty: 21 CAPSULE | Refills: 0 | DISCHARGE
Start: 2017-09-13 | End: 2017-09-20

## 2017-09-13 RX ADMIN — CLINDAMYCIN PHOSPHATE 900 MG: 18 INJECTION, SOLUTION INTRAVENOUS at 06:40

## 2017-09-13 RX ADMIN — GABAPENTIN 300 MG: 300 CAPSULE ORAL at 06:40

## 2017-09-13 RX ADMIN — ASPIRIN 81 MG 81 MG: 81 TABLET ORAL at 06:40

## 2017-09-13 RX ADMIN — CARVEDILOL 3.12 MG: 3.12 TABLET, FILM COATED ORAL at 06:41

## 2017-09-13 NOTE — PLAN OF CARE
Problem: Goal Outcome Summary  Goal: Goal Outcome Summary  Outcome: No Change  Vitals have remained stable this shift. Remains alert and oriented, pleasant. IV abx continue. Denies any pain. Up with SBA, tolerates well. Anticipating d/c today, clinic appt this AM to address infection to R foot. Dressing intact to R 5th toe.      Face to face report given with opportunity to observe patient.     Report given to EUSEBIO Riley   9/13/2017  7:22 AM

## 2017-09-13 NOTE — PLAN OF CARE
Face to face report given with opportunity to observe patient.    Report given to EUSEBIO Wagner   9/12/2017  7:38 PM

## 2017-09-13 NOTE — PLAN OF CARE
Patient discharged at 9:09AM via wheel chair accompanied by spouse and staff. Prescriptions - None ordered for discharge. All belongings sent with patient.     Discharge instructions reviewed with patient. Listed belongings gathered and returned to patient. Clothing, dentures.    Patient discharged to home.   Report called to N/A    Core Measures and Vaccines  Core Measures applicable during stay: No. If yes, state diagnosis: n/a  Pneumonia and Influenza given prior to discharge, if indicated: N/A    Surgical Patient   Surgical Procedures during stay: none  Did patient receive discharge instruction on wound care and recognition of infection symptoms? Yes    MISC  Follow up appointment made:  No, patient has appointment this morning. Home and hospital aquired medications returned to patient: Yes  Patient reports pain was well managed at discharge: Yes

## 2017-09-13 NOTE — DISCHARGE SUMMARY
DISCHARGE DIAGNOSES:   1.  Cellulitis of the right foot fifth toe and osteomyelitis.  Culture done in the clinic was Staph aureus sensitive to all antibiotics tested.   2.  Hypertension.   3.  Hyperlipidemia.   4.  Coronary artery disease.   5.  Diabetes mellitus type 2, diet controlled.   6.  Peripheral neuropathy.      DISCHARGE MEDICATIONS:   1.  Clindamycin 300 t.i.d. x7 days.   2.  Tylenol as needed.   3.  Aspirin 81 mg a day.   4.  Tums b.i.d.   5.  Isosorbide 30 mg daily.   6.  Gabapentin 300 mg t.i.d.   7.  Carvedilol 6.25 mg 1/2 b.i.d.    8.  Cough syrup as needed.     9.  2 grams of Omega fish oil daily.      PROCEDURES PERFORMED AND FINDINGS:  The patient had an MRI of his foot that did confirm the presence of osteomyelitis in the fifth toe.      DISCHARGE DIET:  Two gram sodium diet.      DISCHARGE ACTIVITY:  As tolerated.      FOLLOWUP:  He has an outpatient appointment with Dr. Elham Freeman of [facility] today at 10:30.  Follow up with myself pending that appointment.      DISCHARGE PHYSICAL EXAMINATION:  Please see note dated 09/13/2017.      HOSPITAL COURSE:  Chino Harper is an 88-year-old male that I had seen in the clinic for a foot infection that had been present for 4 weeks prior to seeing me.  I tried outpatient treatment with clindamycin.  Symptoms did not improve, in fact they had worsened with the fact that he followed up with 36 hours and had increasing erythema to the foot.  He was admitted for IV vancomycin and Zosyn pending culture results.  Again, Staph aureus was detected, highly sensitive to all antibiotics.  Vancomycin was discontinued and the patient was continued on Zosyn during the remainder of his hospitalization.  He had some variable blood pressures during this hospitalization and temporarily his Coreg dose was increased to 6.25 mg; however, that was changed back to his normal dosing and his blood pressures were fine.  Otherwise, his hospital course was  uneventful and he was discharged to home in stable condition with the outpatient consultation with surgery as above.         RAMONITA DICKERSON MD             D: 2017 07:36   T: 2017 07:54   MT: odalis      Name:     DARON KAHN   MRN:      [id number]        Account:        [id number]   :      1928           Admit Date:                                       Discharge Date:       Document: [id number]

## 2017-09-13 NOTE — PROGRESS NOTES
[facility]  Progress Note            Subjective:   Feels better since admission. No pain in toe                 Medications:     Current Facility-Administered Medications Ordered in Epic   Medication Dose Route Frequency Last Rate Last Dose     carvedilol (COREG) tablet 3.125 mg  3.125 mg Oral BID w/meals   3.125 mg at 09/13/17 0641     collagenase ointment   Topical Daily         clindamycin (CLEOCIN) infusion 900 mg  900 mg Intravenous Q8H 50 mL/hr at 09/13/17 0640 900 mg at 09/13/17 0640     aspirin chewable tablet 81 mg  81 mg Oral Daily   81 mg at 09/13/17 0640     calcium carbonate (TUMS) chewable tablet 500 mg  500 mg Oral TID PRN         gabapentin (NEURONTIN) capsule 300 mg  300 mg Oral TID   300 mg at 09/13/17 0640     isosorbide mononitrate (IMDUR) 24 hr tablet 30 mg  30 mg Oral QPM   30 mg at 09/12/17 2009     naloxone (NARCAN) injection 0.1-0.4 mg  0.1-0.4 mg Intravenous Q2 Min PRN         lidocaine 1 % 1 mL  1 mL Other Q1H PRN         lidocaine (LMX4) kit   Topical Q1H PRN         sodium chloride (PF) 0.9% PF flush 3 mL  3 mL Intracatheter Q1H PRN         sodium chloride (PF) 0.9% PF flush 3 mL  3 mL Intracatheter Q8H   3 mL at 09/12/17 1428     enoxaparin (LOVENOX) injection 40 mg  40 mg Subcutaneous Q24H   40 mg at 09/12/17 1417     acetaminophen (TYLENOL) tablet 650 mg  650 mg Oral Q4H PRN         HYDROcodone-acetaminophen (NORCO) 5-325 MG per tablet 1-2 tablet  1-2 tablet Oral Q4H PRN         ondansetron (ZOFRAN-ODT) ODT tab 4 mg  4 mg Oral Q6H PRN        Or     ondansetron (ZOFRAN) injection 4 mg  4 mg Intravenous Q6H PRN         No current [facility]-ordered outpatient prescriptions on file.       Review of Systems:   C: NEGATIVE for fever, chills, change in weight  E/M: NEGATIVE for ear, mouth and throat problems  R: NEGATIVE for significant cough or SOB  CV: NEGATIVE for chest pain, palpitations or peripheral edema               Physical Exam:   Vitals were reviewed  Patient Vitals for  the past 24 hrs:   BP Temp Temp src Pulse Heart Rate Resp SpO2   09/13/17 0645 - - - - 66 - 95 %   09/13/17 0145 146/79 98.7  F (37.1  C) Tympanic - 68 20 97 %   09/12/17 1937 118/54 - - 74 - 16 -   09/12/17 1557 163/73 97.8  F (36.6  C) - 78 - 18 -   09/12/17 1135 143/58 (!) 95.4  F (35.2  C) Oral 66 - 18 -     Constitutional: Awake, alert, cooperative.  Eyes:  sclera clear  Lungs: No increased work of breathing, good air exchange, clear to auscultation bilaterally, no crackles or wheezing.  Cardiovascular: Regular rate and rhythm  Abdomen: normal bowel sounds, soft, non-distended, non-tender, no masses palpated,   Neurologic: Awake, alert, oriented to name, place and time.    Neuropsychiatric: Normal affect, mood, orientation, memory and insight.  Ext-edema to foot is gone. Not +2. Erythema regressed signif from foot since yesterday Toe/open draining area slightly improved with use santyl, less edematous toe    Peripheral IV 09/11/17 Left Lower forearm (Active)   Site Assessment WDL 9/13/2017  1:00 AM   Line Status Saline locked 9/13/2017  1:00 AM   Phlebitis Scale 0-->no symptoms 9/13/2017  1:00 AM   Infiltration Scale 0 9/13/2017  1:00 AM   Number of days:2       Wound 09/08/17 Lateral Foot Other (comment) lateral right foot reddened and dressing CDI to right small toe  (Active)   Site Assessment UT 9/13/2017  1:45 AM   Laura-wound Assessment [facility] 9/13/2017  1:45 AM   Size - Length x Width x Depth (cm) 0.8x0.7cm 9/9/2017 10:30 AM   Drainage Amount Small 9/12/2017  3:57 PM   Drainage Color/Charcteristics Serosanguinous 9/12/2017  3:57 PM   Wound Care/Cleansing Normal saline;Other (Comment) 9/11/2017  1:30 PM   Dressing Non adherent;Dry gauze 9/11/2017  1:30 PM   Dressing Status Moist drainage 9/12/2017  3:57 PM   Wound/Skin Edges (WOC) Attached edges 9/8/2017  4:17 PM   Number of days:5     Line/device assessment(s) completed for medical necessity         Data:     Results for orders placed or performed during the  hospital encounter of 09/08/17 (from the past 24 hour(s))   Creatinine   Result Value Ref Range    Creatinine 0.86 0.66 - 1.25 mg/dL    GFR Estimate 83 >60 mL/min/1.7m2    GFR Estimate If Black >90 >60 mL/min/1.7m2     All cardiac studies reviewed by me.  All imaging studies reviewed by me.         Assessment and Plan:   Active Problems:    Cellulitis and abscess of foot   staph aureus. Sensitive to all antibiotics. Discharge on po antibiotics seeing Dr. Freeman today after discharge    Benign essential hypertension  bp better on lower dose coreg

## 2017-09-13 NOTE — PROGRESS NOTES
Name: Chino Harper    MRN#: [id number]    Reason for Hospitalization: RLE cellulitis  Cellulitis and abscess of foot    Discharge Date: 9/13/2017    Patient / Family response to discharge plan: agreeable    Follow-Up Appt: No future appointments.    Other Providers (Care Coordinator, County Services, PCA services etc): No    Discharge Disposition: home    Karina Holder

## 2017-09-13 NOTE — PLAN OF CARE
Problem: Goal Outcome Summary  Goal: Goal Outcome Summary  Outcome: Adequate for Discharge Date Met:  09/13/17  Patient discharged to home.  Denies pain to right baby toe unless touching.  Has appointment this morning at [facility] with surgeon.   States his wife will come and get him.  Dressing changed this morning at 0800.

## 2017-09-13 NOTE — PLAN OF CARE
Face to face report given with opportunity to observe patient.    Report given to EUSEBIO Aquino   9/12/2017  7:44 PM

## 2017-09-14 ENCOUNTER — TELEPHONE (OUTPATIENT)
Dept: CASE MANAGEMENT | Facility: HOSPITAL | Age: 82
End: 2017-09-14

## 2017-09-14 LAB
BACTERIA SPEC CULT: NORMAL
BACTERIA SPEC CULT: NORMAL
Lab: NORMAL
SPECIMEN SOURCE: NORMAL
SPECIMEN SOURCE: NORMAL

## 2017-09-14 NOTE — TELEPHONE ENCOUNTER
Chino Harper, was discharged to home on 9/13/17   from [facility]. I spoke today with Dominik  regarding his  discharge.   He  indicates he  receive(d) sufficient information upon discharge. Medications were reviewed in full on discharge, including: Medications to be started;  medications to be continued from preadmission and any side effects. Prescriptions  - none ordered for discharge. Medications are being taken as prescribed.   He  indicates he went to  an appointment the day he discharged 9/13/17.  He  did not have any questions regarding discharge instructions or condition.  Per their request, the following employee (s) can be recognized for their outstanding services delivered:  Care was excellent.  Suggestions to improve service: nothing indicated  He  was informed he  may receive a survey in the mail from the hospital. Asked if he  would kindly complete the survey in order for [facility] to know if services fully met patient needs.
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
